# Patient Record
Sex: FEMALE | Race: WHITE | NOT HISPANIC OR LATINO | URBAN - METROPOLITAN AREA
[De-identification: names, ages, dates, MRNs, and addresses within clinical notes are randomized per-mention and may not be internally consistent; named-entity substitution may affect disease eponyms.]

---

## 2020-10-23 ENCOUNTER — TELEPHONE (OUTPATIENT)
Dept: VASCULAR SURGERY | Facility: CLINIC | Age: 85
End: 2020-10-23

## 2020-10-26 ENCOUNTER — OFFICE VISIT (OUTPATIENT)
Dept: VASCULAR SURGERY | Facility: CLINIC | Age: 85
End: 2020-10-26
Payer: COMMERCIAL

## 2020-10-26 ENCOUNTER — TRANSCRIBE ORDERS (OUTPATIENT)
Dept: SCHEDULING | Age: 85
End: 2020-10-26

## 2020-10-26 VITALS — HEART RATE: 71 BPM | SYSTOLIC BLOOD PRESSURE: 102 MMHG | OXYGEN SATURATION: 94 % | DIASTOLIC BLOOD PRESSURE: 61 MMHG

## 2020-10-26 DIAGNOSIS — I73.9 PAD (PERIPHERAL ARTERY DISEASE) (CMS/HCC): Primary | ICD-10-CM

## 2020-10-26 PROCEDURE — 99203 OFFICE O/P NEW LOW 30 MIN: CPT | Performed by: NURSE PRACTITIONER

## 2020-10-26 RX ORDER — CARVEDILOL 6.25 MG/1
6.25 TABLET ORAL 2 TIMES DAILY WITH MEALS
COMMUNITY

## 2020-10-26 RX ORDER — ASPIRIN 81 MG/1
81 TABLET ORAL DAILY
COMMUNITY

## 2020-10-26 RX ORDER — ATORVASTATIN CALCIUM 40 MG/1
40 TABLET, FILM COATED ORAL DAILY
COMMUNITY

## 2020-10-26 RX ORDER — FUROSEMIDE 40 MG/1
40 TABLET ORAL 3 TIMES WEEKLY
COMMUNITY

## 2020-10-26 RX ORDER — LISINOPRIL 20 MG/1
20 TABLET ORAL DAILY
COMMUNITY

## 2020-10-26 SDOH — HEALTH STABILITY: MENTAL HEALTH: HOW OFTEN DO YOU HAVE A DRINK CONTAINING ALCOHOL?: NEVER

## 2020-10-26 NOTE — PROGRESS NOTES
[facility]  [facility], [place] [address], [address]  [address] [place] [address]  (P)[phone] (F) [phone]       DETAILS OF VISIT   Visit Date: 10/26/2020  Peripheral Vascular Disease (NP-non healing wound)      Lashae Eldridge presents to the office today as a new patient referred to us for nonhealing wound to the right outer aspect of her ankle.  Patient has had this wound for a number of months.  She has tried Santyl and multiple treatments.  Patient had ABIs completed by her podiatrist which were 0.63 on the right lower extremity.  At this time we will further evaluate her lower extremity with an arterial duplex.  She denies claudication pain, and has been a non-smoker.  Patient has Doppler pulses to the right lower extremity.  Patient has no ischemic changes.  Patient is a nondiabetic.    MEDICATIONS     •  aspirin  •  atorvastatin  •  carvediloL  •  furosemide  •  lisinopriL    PAST MEDICAL AND SURGICAL HISTORY     Past Medical History:   Diagnosis Date   • CHF (congestive heart failure) (CMS/[facility])      History reviewed. No pertinent surgical history.  Family History   Problem Relation Age of Onset   • No Known Problems Biological Mother    • No Known Problems Biological Father    • Diabetes Biological Son        ALLERGIES     Patient has no known allergies.    SOCIAL/TOBACCO HISTORY     Social History     Socioeconomic History   • Marital status:      Spouse name: None   • Number of children: None   • Years of education: None   • Highest education level: None   Occupational History   • None   Social Needs   • Financial resource strain: None   • Food insecurity     Worry: None     Inability: None   • Transportation needs     Medical: None     Non-medical: None   Tobacco Use   • Smoking status: Never Smoker   • Smokeless tobacco: Never Used   Substance and Sexual Activity   • Alcohol use: Never     Frequency: Never   • Drug use: Defer   • Sexual activity: Defer    Lifestyle   • Physical activity     Days per week: None     Minutes per session: None   • Stress: None   Relationships   • Social connections     Talks on phone: None     Gets together: None     Attends Rastafarian service: None     Active member of club or organization: None     Attends meetings of clubs or organizations: None     Relationship status: None   • Intimate partner violence     Fear of current or ex partner: None     Emotionally abused: None     Physically abused: None     Forced sexual activity: None   Other Topics Concern   • None   Social History Narrative   • None     Social History     Tobacco Use   Smoking Status Never Smoker   Smokeless Tobacco Never Used       REVIEW OF SYSTEMS     Constitutional: No fever, no chills, and no recent weight change. No headache.  HEENT: No neck pain, no neck stiffness, and no lump or swelling in the neck. no hoarseness, no dysphagia.   Cardiovascular: No chest pain or discomfort, no palpitations.  Respiratory: No wheezing. No shortness of breath, no cough, no dyspnea.  Gastrointestinal: Appetite without significant change. No dysphagia, no active abdominal pain, and no melena. No bright red blood per rectum.  Genitourinary: No hematuria, No genital lesion.  No obvious bladder changes.   Endocrine: No polydipsia and no excessive sweating.  Hematologic: No easy bleeding and no tendency for easy bruising.   Integumentary: No obvious masses, No pruritus. No skin lesions and no rash  Musculoskeletal: No new muscle tenderness.  Neurological: No new motor disturbances, or sensory disturbances.   Psychological: Appropriate.    PHYSICAL EXAM     Vitals:   Visit Vitals  /61   Pulse 71   SpO2 94%       Constitutional: alert, appears stated age and cooperative  HEENT: normocephalic, without obvious abnormality, atraumatic, Neck is supple,  symmetrical, trachea midline  Musculoskeletal: symmetric, no curvature. ROM normal. No CVA tenderness.  clear to auscultation  bilaterally  Cardiovascular: No chest pain or discomfort, no palpitations. No edema, cold feet, or claudication.  Respiratory: Clear to auscultation bilaterally,chest expansion symmetric,  eupnea, no adventitious sounds (rales, crackles, wheezes) no wheezing, no rhonki  Gastrointestinal: soft, non-tender; bowel sounds normal; no masses, no organomegaly  Extremities: extremities warm, no cyanosis, clubbing, or edema  Pulses: Right Pulses: FEM: barely palpable, POP: doppler, DP: doppler, PT: doppler  Integumentary: Skin color, texture, turgor normal. No rashes or lesions  Neurologic: No confusion was observed. Oriented to time,place and person. No disorientation was observed. Normal speech, motor,balance, and gait and stance.     IMAGING     I have reviewed the pertinent patient's imaging results. ABIs 0.63 on the right lower extremity.    ASSESSMENT/PLAN     Problem List Items Addressed This Visit        Circulatory    PAD (peripheral artery disease) (CMS/HCC) - Primary    Current Assessment & Plan     Recommend arterial ultrasound of her right lower extremity for non-healing ulceration, pt with abnormal ABIs         Relevant Orders    Ultrasound arterial leg right            MIGDALIA Cheek  10/26/2020    Thank you very much for allowing us to participate in the care of your patient. Please do not hesitate to call or email if there are any questions. The office number is [phone] and my email is [email or web address].  Sincerely,  Deena Dougherty        This document was generated utilizing voice recognition technology. An attempt at proofreading has been made to minimize errors but topographical errors may be present.

## 2020-10-26 NOTE — ASSESSMENT & PLAN NOTE
Recommend arterial ultrasound of her right lower extremity for non-healing ulceration, pt with abnormal ABIs

## 2020-10-26 NOTE — PATIENT INSTRUCTIONS
Dear Lashae Eldridge    Your physician has requested that you have follow up vascular testing.    To schedule your appointment at the [place] location, please call [phone].    The name(s) of your test(s) is/are:    US ARTERIAL LEG RIGHT    After you schedule your testing, please call our office at [phone] to schedule your follow up.     *If you are having the test outside of the [facility], we ask that you obtain a CD of the study and a copy of your report.   Please bring it to our office at least three (3) days prior to your appointment for the physician to review.    If you have any questions, please call the office at [phone].      Thank you,    [facility]

## 2020-10-27 DIAGNOSIS — I73.9 PAD (PERIPHERAL ARTERY DISEASE) (CMS/HCC): Primary | ICD-10-CM

## 2020-11-02 ENCOUNTER — HOSPITAL ENCOUNTER (OUTPATIENT)
Dept: CARDIOLOGY | Facility: HOSPITAL | Age: 85
Discharge: HOME | End: 2020-11-02
Attending: NURSE PRACTITIONER
Payer: COMMERCIAL

## 2020-11-02 ENCOUNTER — OFFICE VISIT (OUTPATIENT)
Dept: VASCULAR SURGERY | Facility: CLINIC | Age: 85
End: 2020-11-02
Payer: COMMERCIAL

## 2020-11-02 VITALS — OXYGEN SATURATION: 95 % | HEART RATE: 67 BPM | DIASTOLIC BLOOD PRESSURE: 89 MMHG | SYSTOLIC BLOOD PRESSURE: 157 MMHG

## 2020-11-02 DIAGNOSIS — I73.9 PAD (PERIPHERAL ARTERY DISEASE) (CMS/HCC): ICD-10-CM

## 2020-11-02 DIAGNOSIS — I73.9 PAD (PERIPHERAL ARTERY DISEASE) (CMS/HCC): Primary | ICD-10-CM

## 2020-11-02 PROCEDURE — 93922 UPR/L XTREMITY ART 2 LEVELS: CPT

## 2020-11-02 PROCEDURE — 93922 UPR/L XTREMITY ART 2 LEVELS: CPT | Mod: 26 | Performed by: SURGERY

## 2020-11-02 PROCEDURE — 93926 LOWER EXTREMITY STUDY: CPT | Mod: RT

## 2020-11-02 PROCEDURE — 93926 LOWER EXTREMITY STUDY: CPT | Mod: 26,RT | Performed by: SURGERY

## 2020-11-02 PROCEDURE — 99214 OFFICE O/P EST MOD 30 MIN: CPT | Performed by: NURSE PRACTITIONER

## 2020-11-02 NOTE — ASSESSMENT & PLAN NOTE
Patient will to the office in 2 to 3 weeks for wound check to the right outer ankle.  Patient also discussed upcoming possible procedure with her podiatrist.  She will continue using her Santyl.  Pictures were taken at today's visit for comparison in 3 weeks.  Patient with evidence of tibial vessel disease on recent ultrasound.  I would recommend an angiogram if wound does not fully resolve.  Patient may also notify the office if they would like to move forward with this procedure prior to her next visit.  We discussed the procedure in detail and patient was given written materials to review.

## 2020-11-02 NOTE — PROGRESS NOTES
[facility]  [facility] [address], [address]  [address] [place] [address]  (P)[phone] (F) [phone]       DETAILS OF VISIT   Visit Date: 11/2/2020  Follow-up (F/u w/Arterials done today)      Lashae Eldridge presents to the office today following her arterial studies completed this morning.  Arterials were reviewed with patient and her daughter during today's visit.  Patient has a wound to the right outer ankle that has not resolved over the past few months.  Patient has been placing Santyl to the wound and she feels that it has improved over the past 2 weeks.  We discussed having a angiogram for her tibial vessel disease to help expedite wound healing.  The patient is slightly reluctant at this time and would like to give the Santyl a little longer to work.  Patient will also discuss this with her podiatrist who she is following up with next week.    MEDICATIONS     •  aspirin  •  atorvastatin  •  carvediloL  •  furosemide  •  lisinopriL    PAST MEDICAL AND SURGICAL HISTORY     Past Medical History:   Diagnosis Date   • CHF (congestive heart failure) (CMS/[facility])      History reviewed. No pertinent surgical history.  Family History   Problem Relation Age of Onset   • No Known Problems Biological Mother    • No Known Problems Biological Father    • Diabetes Biological Son        ALLERGIES     Patient has no known allergies.    SOCIAL/TOBACCO HISTORY     Social History     Socioeconomic History   • Marital status:      Spouse name: None   • Number of children: None   • Years of education: None   • Highest education level: None   Occupational History   • None   Social Needs   • Financial resource strain: None   • Food insecurity     Worry: None     Inability: None   • Transportation needs     Medical: None     Non-medical: None   Tobacco Use   • Smoking status: Never Smoker   • Smokeless tobacco: Never Used   Substance and Sexual Activity   • Alcohol use: Never      Frequency: Never   • Drug use: Defer   • Sexual activity: Defer   Lifestyle   • Physical activity     Days per week: None     Minutes per session: None   • Stress: None   Relationships   • Social connections     Talks on phone: None     Gets together: None     Attends Tenriism service: None     Active member of club or organization: None     Attends meetings of clubs or organizations: None     Relationship status: None   • Intimate partner violence     Fear of current or ex partner: None     Emotionally abused: None     Physically abused: None     Forced sexual activity: None   Other Topics Concern   • None   Social History Narrative   • None     Social History     Tobacco Use   Smoking Status Never Smoker   Smokeless Tobacco Never Used       REVIEW OF SYSTEMS     Constitutional: No fever, no chills, and no recent weight change. No headache.  HEENT: No neck pain, no neck stiffness, and no lump or swelling in the neck. no hoarseness, no dysphagia.   Cardiovascular: No chest pain or discomfort, no palpitations.  Respiratory: No wheezing. No shortness of breath, no cough, no dyspnea.  Gastrointestinal: Appetite without significant change. No dysphagia, no active abdominal pain, and no melena. No bright red blood per rectum.  Genitourinary: No hematuria, No genital lesion.  No obvious bladder changes.   Endocrine: No polydipsia and no excessive sweating.  Hematologic: No easy bleeding and no tendency for easy bruising.   Integumentary: No obvious masses, No pruritus. No skin lesions and no rash  Musculoskeletal: No new muscle tenderness.  Neurological: No new motor disturbances, or sensory disturbances.   Psychological: Appropriate.    PHYSICAL EXAM     Vitals:   Visit Vitals  BP (!) 157/89   Pulse 67   SpO2 95%       Constitutional: alert, appears stated age and cooperative  HEENT: normocephalic, without obvious abnormality, atraumatic, Neck is supple,  symmetrical, trachea midline  Musculoskeletal: symmetric, no  curvature. ROM normal. No CVA tenderness.  clear to auscultation bilaterally  Cardiovascular: No chest pain or discomfort, no palpitations. No edema, cold feet, or claudication.  Respiratory: Clear to auscultation bilaterally,chest expansion symmetric,  eupnea, no adventitious sounds (rales, crackles, wheezes) no wheezing, no rhonki  Gastrointestinal: soft, non-tender; bowel sounds normal; no masses, no organomegaly  Extremities: extremities warm, no cyanosis, clubbing, or edema  Pulses: Right Pulses: FEM: barely palpable, POP: barely palpable, DP: doppler, PT: doppler  Integumentary: Skin color, texture, turgor normal. No rashes or lesions  Neurologic: No confusion was observed. Oriented to time,place and person. No disorientation was observed. Normal speech, motor,balance, and gait and stance.     IMAGING     I have reviewed the pertinent patient's imaging results.  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    ASSESSMENT/PLAN     Problem List Items Addressed This Visit        Circulatory    PAD (peripheral artery disease) (CMS/[facility]) - Primary    Current Assessment & Plan     Patient will to the office in 2 to 3 weeks for wound check to the right outer ankle.  Patient also discussed upcoming possible procedure with her podiatrist.  She will continue using her Santyl.  Pictures were taken at today's visit for comparison in 3 weeks.  Patient with evidence of tibial vessel disease on recent ultrasound.  I would recommend an angiogram if wound does not fully resolve.  Patient may also notify the office if they would like to move forward with this procedure prior to her next visit.  We discussed the procedure in detail and patient was given written materials to review.                 MIGDALIA Cheek  11/2/2020    Thank you very much for allowing us to participate in the care of your patient. Please do not hesitate to call or email if there are any questions. The office number is [phone] and my  email is [email or web address].  Sincerely,  Deena Dougherty        This document was generated utilizing voice recognition technology. An attempt at proofreading has been made to minimize errors but topographical errors may be present.

## 2020-11-06 LAB
LEFT 1ST TOE INDEX: 0.5
LEFT 1ST TOE: 81 MMHG
LEFT ABI: 0.95
LEFT ARM BP: 163 MMHG
LEFT DORSALIS PEDIS INDEX: 0.91
LEFT DORSALIS PEDIS: 149 MMHG
LEFT POST TIBIAL INDEX: 0.95
LEFT POSTERIOR TIBIAL: 155 MMHG
LEFT TBI: 0.5
RIGHT 1ST TOE INDEX: 0.45
RIGHT 1ST TOE: 74 MMHG
RIGHT ABI: 0.73
RIGHT ARM BP: 160 MMHG
RIGHT DORSALIS PEDIS INDEX: 0.73
RIGHT DORSALIS PEDIS: 119 MMHG
RIGHT POST TIBIAL INDEX: 0.31
RIGHT POSTERIOR TIBIAL: 51 MMHG
RIGHT TBI: 0.45
RT ANT TIBIAL MID PSV: 67.28 CM/S
RT CFA DIST PSV: 49.71 CM/S
RT PERONEAL MID PSV: 37.62 CM/S
RT POPLITEAL DIST PSV: 48.61 CM/S
RT POPLITEAL PROX PSV: 56.3 CM/S
RT POPLITEAL PROX RATIO: 0.81
RT PROFUNDA PROX PSV: 58.5 CM/S
RT PROFUNDA PROX RATIO: 1.18
RT SFA DIST PSV: 69.48 CM/S
RT SFA MID PSV: 68.38 CM/S
RT SFA PROX PSV: 54.1 CM/S
RT SFA PROX RATIO: 1.09

## 2020-11-18 DIAGNOSIS — I73.9 PERIPHERAL VASCULAR DISEASE (CMS/HCC): Primary | ICD-10-CM

## 2020-11-18 DIAGNOSIS — Z01.818 PRE-OP TESTING: ICD-10-CM

## 2020-11-19 ENCOUNTER — APPOINTMENT (OUTPATIENT)
Dept: LAB | Facility: HOSPITAL | Age: 85
End: 2020-11-19
Attending: NURSE PRACTITIONER
Payer: COMMERCIAL

## 2020-11-19 ENCOUNTER — OFFICE VISIT (OUTPATIENT)
Dept: VASCULAR SURGERY | Facility: CLINIC | Age: 85
End: 2020-11-19
Payer: COMMERCIAL

## 2020-11-19 ENCOUNTER — APPOINTMENT (OUTPATIENT)
Dept: LAB | Facility: HOSPITAL | Age: 85
End: 2020-11-19
Attending: SURGERY
Payer: COMMERCIAL

## 2020-11-19 DIAGNOSIS — Z01.818 PRE-OP TESTING: Primary | ICD-10-CM

## 2020-11-19 DIAGNOSIS — I73.9 PERIPHERAL VASCULAR DISEASE (CMS/HCC): ICD-10-CM

## 2020-11-19 DIAGNOSIS — Z01.818 PRE-OP TESTING: ICD-10-CM

## 2020-11-19 DIAGNOSIS — I73.9 PAD (PERIPHERAL ARTERY DISEASE) (CMS/HCC): Primary | ICD-10-CM

## 2020-11-19 LAB
ERYTHROCYTE [DISTWIDTH] IN BLOOD BY AUTOMATED COUNT: 14 % (ref 11.7–14.4)
HCT VFR BLDCO AUTO: 36.7 % (ref 35–45)
HGB BLD-MCNC: 11.2 G/DL (ref 11.8–15.7)
INR PPP: 1 INR
MCH RBC QN AUTO: 30.2 PG (ref 28–33.2)
MCHC RBC AUTO-ENTMCNC: 30.5 G/DL (ref 32.2–35.5)
MCV RBC AUTO: 98.9 FL (ref 83–98)
PDW BLD AUTO: 10.4 FL (ref 9.4–12.3)
PLATELET # BLD AUTO: 244 K/UL (ref 150–369)
PROTHROMBIN TIME: 13.1 SEC (ref 12.2–14.5)
RBC # BLD AUTO: 3.71 M/UL (ref 3.93–5.22)
WBC # BLD AUTO: 4.56 K/UL (ref 3.8–10.5)

## 2020-11-19 PROCEDURE — 99213 OFFICE O/P EST LOW 20 MIN: CPT | Performed by: SURGERY

## 2020-11-19 PROCEDURE — 80048 BASIC METABOLIC PNL TOTAL CA: CPT

## 2020-11-19 PROCEDURE — 36415 COLL VENOUS BLD VENIPUNCTURE: CPT

## 2020-11-19 PROCEDURE — 85610 PROTHROMBIN TIME: CPT

## 2020-11-19 PROCEDURE — 85027 COMPLETE CBC AUTOMATED: CPT

## 2020-11-19 PROCEDURE — U0003 INFECTIOUS AGENT DETECTION BY NUCLEIC ACID (DNA OR RNA); SEVERE ACUTE RESPIRATORY SYNDROME CORONAVIRUS 2 (SARS-COV-2) (CORONAVIRUS DISEASE [COVID-19]), AMPLIFIED PROBE TECHNIQUE, MAKING USE OF HIGH THROUGHPUT TECHNOLOGIES AS DESCRIBED BY CMS-2020-01-R: HCPCS

## 2020-11-19 NOTE — ASSESSMENT & PLAN NOTE
Lashae Eldridge is an 89-year-old who presents for wound recheck.  She has a 1 cm ulcer with fibrinous exudate on the right lateral malleolus without signs of surrounding infection.  This wound is been present for several months.  Right resting STORMY is 0.73 with an ultrasound that suggests a posterior tibial occlusion.  Her foot has an appearance of chronic ischemia despite the STORMY.  Recommend diagnostic angiogram with possible interventions which we will perform next week.

## 2020-11-19 NOTE — PROGRESS NOTES
[facility]  [facility] [address]  [address] [place] [address]  P: [phone]     F: [phone]       FOLLOW UP VISIT   11/19/2020  Lashae Eldridge               YOB: 1930  [id number]    PCP:  Luis Alberto Iverson DO    Diagnosis:  Non healing wound right lateral malleolus     HISTORY OF PRESENT ILLNESS        Lashae Eldridge is a 89 y.o. female returning to the office for continued management of right lower extremity nonhealing wounds.  The wound is been present for months.  She was previously evaluated with an STORMY and duplex demonstrating a chronic posterior tibial occlusion with an STORMY mildly diminished at 0.7.      PAST MEDICAL AND SURGICAL HISTORY        Past Medical History:   Diagnosis Date   • CHF (congestive heart failure) (CMS/[facility])        History reviewed. No pertinent surgical history.    MEDICATIONS        Current Outpatient Medications on File Prior to Visit   Medication Sig Dispense Refill   • aspirin 81 mg enteric coated tablet Take 81 mg by mouth daily.     • atorvastatin (LIPITOR) 40 mg tablet Take 40 mg by mouth daily.     • carvediloL (COREG) 25 mg tablet Take 25 mg by mouth 2 (two) times a day with meals.     • furosemide (LASIX) 40 mg tablet Take 40 mg by mouth daily.     • lisinopriL (PRINIVIL) 20 mg tablet Take 20 mg by mouth daily.       No current facility-administered medications on file prior to visit.        ALLERGIES        Patient has no known allergies.     PHYSICAL EXAMINATION      There were no vitals taken for this visit.  There is no height or weight on file to calculate BMI.      PHYSICAL EXAM:    Head/face: normocephalic, atraumatic, no facial asymmetry.  Neck: supple; trachea midline; no carotid bruits, no cervical lymphadenopathy.  Chest: Heart with regular rate and rhythm, S1/S2, no murmur. Lung clear to auscultation bilaterally without crackles, rales, or rhonchi.  Abdomen: soft, non tender, and  non-distended without guarding or rebound.  No masses or organomegaly.  No hernias.  No abdominal bruits.  Back/spine: no CVA tenderness.  Right lower extremity: Pulse exam posterior tibial monophasic signal.  No palpable dorsal pedal or posterior tibial pulse.  There is a 1 cm ulcer with fibrinous exudate on the right lateral malleolus without evidence of drainage, purulence, or significant surrounding erythema.  The foot appears chronically ischemic.    LABS / IMAGING / EKG        Imaging  Ankle-brachial indices and duplex reviewed by me demonstrate a posterior tibial occlusion on the right leg as well as a resting STORMY of 0.73 on the right.    ASSESSMENT AND PLAN         Problem List Items Addressed This Visit        Circulatory    PAD (peripheral artery disease) (CMS/HCC) - Primary    Current Assessment & Plan     Lashae Eldridge is an 89-year-old who presents for wound recheck.  She has a 1 cm ulcer with fibrinous exudate on the right lateral malleolus without signs of surrounding infection.  This wound is been present for several months.  Right resting STORMY is 0.73 with an ultrasound that suggests a posterior tibial occlusion.  Her foot has an appearance of chronic ischemia despite the STORMY.  Recommend diagnostic angiogram with possible interventions which we will perform next week.                  Return in about 1 week (around 11/26/2020).       Waldo Ramos MD  Vascular and Endovascular Surgery  [facility]      Thank you very much for allowing us to participate in the care of your patient.  I will keep you informed of Lashae Eldridge's care.  Please not hesitate to call or email if there are any questions.  I can be reached at [phone] (mobile) or [email or web address].  Sincerely.    Pierre Ramos    This document was generated utilizing voice recognition technology. An attempt at proofreading has been made to minimize errors but typographical errors may be present.

## 2020-11-19 NOTE — LETTER
November 19, 2020     Luis Alberto Iverson DO  [address] [address]    Patient: Lashae Eldridge  YOB: 1930  Date of Visit: 11/19/2020      Dear Dr. Iverson:    Thank you for referring Lashae Eldridge to me for evaluation. Below are my notes for this consultation.    If you have questions, please do not hesitate to call me. I look forward to following your patient along with you.         Sincerely,        Waldo Ramos MD        CC: No Recipients  Waldo Ramos MD  11/19/2020  3:02 PM  Signed       [facility]  [facility] [address]  [address] [place] [address]  P: [phone]     F: [phone]       FOLLOW UP VISIT   11/19/2020  Lashae Eldridge               YOB: 1930  [id number]    PCP:  Luis Alberto Iverson DO    Diagnosis:  Non healing wound right lateral malleolus     HISTORY OF PRESENT ILLNESS        Lashae Eldridge is a 89 y.o. female returning to the office for continued management of right lower extremity nonhealing wounds.  The wound is been present for months.  She was previously evaluated with an STORMY and duplex demonstrating a chronic posterior tibial occlusion with an STORMY mildly diminished at 0.7.      PAST MEDICAL AND SURGICAL HISTORY        Past Medical History:   Diagnosis Date   • CHF (congestive heart failure) (CMS/[facility])        History reviewed. No pertinent surgical history.    MEDICATIONS        Current Outpatient Medications on File Prior to Visit   Medication Sig Dispense Refill   • aspirin 81 mg enteric coated tablet Take 81 mg by mouth daily.     • atorvastatin (LIPITOR) 40 mg tablet Take 40 mg by mouth daily.     • carvediloL (COREG) 25 mg tablet Take 25 mg by mouth 2 (two) times a day with meals.     • furosemide (LASIX) 40 mg tablet Take 40 mg by mouth daily.     • lisinopriL (PRINIVIL) 20 mg tablet Take 20 mg by mouth daily.       No current facility-administered medications on file  prior to visit.        ALLERGIES        Patient has no known allergies.     PHYSICAL EXAMINATION      There were no vitals taken for this visit.  There is no height or weight on file to calculate BMI.      PHYSICAL EXAM:    Head/face: normocephalic, atraumatic, no facial asymmetry.  Neck: supple; trachea midline; no carotid bruits, no cervical lymphadenopathy.  Chest: Heart with regular rate and rhythm, S1/S2, no murmur. Lung clear to auscultation bilaterally without crackles, rales, or rhonchi.  Abdomen: soft, non tender, and non-distended without guarding or rebound.  No masses or organomegaly.  No hernias.  No abdominal bruits.  Back/spine: no CVA tenderness.  Right lower extremity: Pulse exam posterior tibial monophasic signal.  No palpable dorsal pedal or posterior tibial pulse.  There is a 1 cm ulcer with fibrinous exudate on the right lateral malleolus without evidence of drainage, purulence, or significant surrounding erythema.  The foot appears chronically ischemic.    LABS / IMAGING / EKG        Imaging  Ankle-brachial indices and duplex reviewed by me demonstrate a posterior tibial occlusion on the right leg as well as a resting STORMY of 0.73 on the right.    ASSESSMENT AND PLAN         Problem List Items Addressed This Visit        Circulatory    PAD (peripheral artery disease) (CMS/HCC) - Primary    Current Assessment & Plan     Lashae Eldridge is an 89-year-old who presents for wound recheck.  She has a 1 cm ulcer with fibrinous exudate on the right lateral malleolus without signs of surrounding infection.  This wound is been present for several months.  Right resting STORMY is 0.73 with an ultrasound that suggests a posterior tibial occlusion.  Her foot has an appearance of chronic ischemia despite the STORMY.  Recommend diagnostic angiogram with possible interventions which we will perform next week.                  Return in about 1 week (around 11/26/2020).       Waldo Ramos MD  Vascular and Endovascular  Surgery  [facility]      Thank you very much for allowing us to participate in the care of your patient.  I will keep you informed of Lashae Eldridge's care.  Please not hesitate to call or email if there are any questions.  I can be reached at [phone] (mobile) or [email or web address].  Sincerely.    Pierre Ramos    This document was generated utilizing voice recognition technology. An attempt at proofreading has been made to minimize errors but typographical errors may be present.

## 2020-11-20 LAB
ANION GAP SERPL CALC-SCNC: 12 MEQ/L (ref 3–15)
BUN SERPL-MCNC: 21 MG/DL (ref 8–20)
CALCIUM SERPL-MCNC: 8.9 MG/DL (ref 8.9–10.3)
CHLORIDE SERPL-SCNC: 96 MEQ/L (ref 98–109)
CO2 SERPL-SCNC: 29 MEQ/L (ref 22–32)
CREAT SERPL-MCNC: 1.2 MG/DL (ref 0.6–1.1)
GFR SERPL CREATININE-BSD FRML MDRD: 42.4 ML/MIN/1.73M*2
GLUCOSE SERPL-MCNC: 87 MG/DL (ref 70–99)
POTASSIUM SERPL-SCNC: 4.4 MEQ/L (ref 3.6–5.1)
SODIUM SERPL-SCNC: 137 MEQ/L (ref 136–144)

## 2020-11-22 LAB — SARS-COV-2 RNA RESP QL NAA+PROBE: NOT DETECTED

## 2020-11-24 ENCOUNTER — HOSPITAL ENCOUNTER (OUTPATIENT)
Facility: HOSPITAL | Age: 85
Discharge: HOME | End: 2020-11-24
Attending: SURGERY | Admitting: SURGERY
Payer: COMMERCIAL

## 2020-11-24 VITALS
DIASTOLIC BLOOD PRESSURE: 74 MMHG | HEART RATE: 72 BPM | OXYGEN SATURATION: 100 % | SYSTOLIC BLOOD PRESSURE: 128 MMHG | BODY MASS INDEX: 17.07 KG/M2 | WEIGHT: 100 LBS | RESPIRATION RATE: 20 BRPM | HEIGHT: 64 IN

## 2020-11-24 DIAGNOSIS — I73.9 PERIPHERAL VASCULAR DISEASE (CMS/HCC): ICD-10-CM

## 2020-11-24 DIAGNOSIS — Z01.818 PRE-OP TESTING: ICD-10-CM

## 2020-11-24 PROCEDURE — 25000000 HC PHARMACY GENERAL: Performed by: SURGERY

## 2020-11-24 PROCEDURE — 37224 PERIPHERAL CATHETERIZATION: CPT | Mod: RT | Performed by: SURGERY

## 2020-11-24 PROCEDURE — 047M3ZZ DILATION OF RIGHT POPLITEAL ARTERY, PERCUTANEOUS APPROACH: ICD-10-PCS | Performed by: SURGERY

## 2020-11-24 PROCEDURE — 63600105 HC IODINE BASED CONTRAST: Mod: JW | Performed by: SURGERY

## 2020-11-24 PROCEDURE — C1725 CATH, TRANSLUMIN NON-LASER: HCPCS | Performed by: SURGERY

## 2020-11-24 PROCEDURE — 37228 PERIPHERAL CATHETERIZATION: CPT | Mod: RT | Performed by: SURGERY

## 2020-11-24 PROCEDURE — 71000001 HC PACU PHASE 1 INITIAL 30MIN: Performed by: SURGERY

## 2020-11-24 PROCEDURE — 63600000 HC DRUGS/DETAIL CODE: Performed by: SURGERY

## 2020-11-24 PROCEDURE — 047T3ZZ DILATION OF RIGHT PERONEAL ARTERY, PERCUTANEOUS APPROACH: ICD-10-PCS | Performed by: SURGERY

## 2020-11-24 PROCEDURE — C1769 GUIDE WIRE: HCPCS | Performed by: SURGERY

## 2020-11-24 PROCEDURE — 76937 US GUIDE VASCULAR ACCESS: CPT | Performed by: SURGERY

## 2020-11-24 PROCEDURE — 63700000 HC SELF-ADMINISTRABLE DRUG: Performed by: SURGERY

## 2020-11-24 PROCEDURE — 37224 HC FEM/POPL REVASC W/TLA: CPT | Performed by: SURGERY

## 2020-11-24 PROCEDURE — B41F1ZZ FLUOROSCOPY OF RIGHT LOWER EXTREMITY ARTERIES USING LOW OSMOLAR CONTRAST: ICD-10-PCS | Performed by: SURGERY

## 2020-11-24 PROCEDURE — 37228 HC TIB/PER REVASC W/TLA: CPT | Mod: RT

## 2020-11-24 PROCEDURE — C1894 INTRO/SHEATH, NON-LASER: HCPCS | Performed by: SURGERY

## 2020-11-24 PROCEDURE — 71000011 HC PACU PHASE 1 EA ADDL MIN: Performed by: SURGERY

## 2020-11-24 PROCEDURE — 75710 ARTERY X-RAYS ARM/LEG: CPT | Mod: RT | Performed by: SURGERY

## 2020-11-24 PROCEDURE — 27200000 HC STERILE SUPPLY: Performed by: SURGERY

## 2020-11-24 PROCEDURE — C1887 CATHETER, GUIDING: HCPCS | Performed by: SURGERY

## 2020-11-24 PROCEDURE — C1760 CLOSURE DEV, VASC: HCPCS | Performed by: SURGERY

## 2020-11-24 RX ORDER — MIDAZOLAM HYDROCHLORIDE 2 MG/2ML
INJECTION, SOLUTION INTRAMUSCULAR; INTRAVENOUS AS NEEDED
Status: DISCONTINUED | OUTPATIENT
Start: 2020-11-24 | End: 2020-11-24 | Stop reason: HOSPADM

## 2020-11-24 RX ORDER — PROTAMINE SULFATE 10 MG/ML
INJECTION, SOLUTION INTRAVENOUS AS NEEDED
Status: DISCONTINUED | OUTPATIENT
Start: 2020-11-24 | End: 2020-11-24 | Stop reason: HOSPADM

## 2020-11-24 RX ORDER — HEPARIN SODIUM 1000 [USP'U]/ML
INJECTION, SOLUTION INTRAVENOUS; SUBCUTANEOUS AS NEEDED
Status: DISCONTINUED | OUTPATIENT
Start: 2020-11-24 | End: 2020-11-24 | Stop reason: HOSPADM

## 2020-11-24 RX ORDER — FENTANYL CITRATE 50 UG/ML
INJECTION, SOLUTION INTRAMUSCULAR; INTRAVENOUS AS NEEDED
Status: DISCONTINUED | OUTPATIENT
Start: 2020-11-24 | End: 2020-11-24 | Stop reason: HOSPADM

## 2020-11-24 RX ORDER — LIDOCAINE HYDROCHLORIDE 10 MG/ML
INJECTION, SOLUTION INFILTRATION; PERINEURAL AS NEEDED
Status: DISCONTINUED | OUTPATIENT
Start: 2020-11-24 | End: 2020-11-24 | Stop reason: HOSPADM

## 2020-11-24 RX ORDER — IODIXANOL 320 MG/ML
INJECTION, SOLUTION INTRAVASCULAR AS NEEDED
Status: DISCONTINUED | OUTPATIENT
Start: 2020-11-24 | End: 2020-11-24 | Stop reason: HOSPADM

## 2020-11-24 RX ORDER — CLOPIDOGREL BISULFATE 75 MG/1
75 TABLET ORAL DAILY
Qty: 30 TABLET | Refills: 0 | Status: SHIPPED | OUTPATIENT
Start: 2020-11-24 | End: 2020-12-21 | Stop reason: SDUPTHER

## 2020-11-24 RX ORDER — CLOPIDOGREL BISULFATE 75 MG/1
TABLET ORAL AS NEEDED
Status: DISCONTINUED | OUTPATIENT
Start: 2020-11-24 | End: 2020-11-24 | Stop reason: HOSPADM

## 2020-11-24 NOTE — Clinical Note
The bilateral groins was site clipped, marked  and prepped with ChloraPrep. The patient was draped in a sterile fashion after allowing for the recommended dry time.

## 2020-11-24 NOTE — PRE-SEDATION DOCUMENTATION
Sedation Plan    ASA 3     Mallampati class: I.    Risks, benefits, and alternatives discussed with patient.

## 2020-11-24 NOTE — Clinical Note
Patient placed on procedure table in supine position with arms at side. Positioning devices: arm board under arms and safety strap applied.

## 2020-11-24 NOTE — DISCHARGE INSTRUCTIONS
You underwent angiogram of the right lower extremity with balloon angioplasty of the peroneal artery (below the knee).    The following are instructions from the physician for care after an angiogram.    The Day of the Angiogram    Activity:  Light activity   No heavy lifting or strenuous activity for 2 days  Diet and Medications:  Increase your fluid intake.   If you have kidney disease or congestive heart failure, check with your primary doctor first.   Resume your usual diet and medications unless instructed otherwise.  Bathing:  You may shower.   No tub or jacuzzi baths for 7 days.  Dressing:  Leave dressing in place.      The Day After the Angiogram     Activity:   Gradually increase activity.   You may return to work if you do non-strenuous activities.   Avoid heavy lifting or exercise.  Care of Puncture Site:  You may shower.   After showering, gently pat area dry around puncture site.   Place a Band-Aid over the puncture site.   Keep clean and dry.   Apply a clean Band-Aid until the area is healed.    Second Day after the Angiogram    Activity:  Resume usual activities.   Return to work unless otherwise instructed.   Avoid strenuous exercise of excessive bending at the groin.  Warning Signs  If any of the following symptoms occur, call your physician [phone].  If you experience a life-threatening emergency, call 911.  Pain, numbness, coolness or weakness in your leg.   Redness, swelling or bleeding at the puncture site.   Bloody drainage that leaks around or soaks through the Band-Aid dressing.   Green or yellow drainage from the puncture site.   Temperature higher than 100 degrees Fahrenheit.  Future Appointments and Treatment:  Please contact the office at [phone] for a follow-up appointment to discuss the results of your angiogram.

## 2020-11-24 NOTE — Clinical Note
Patient placed on procedure table in with arms at side. Positioning devices: arm board under arms and safety strap applied.

## 2020-11-24 NOTE — OR SURGEON
Pre-Procedure patient identification:  I am the primary operating surgeon/proceduralist and I have identified the patient and confirmed laterality is right on 11/24/20 at 12:44 PM Waldo Ramos MD  Phone Number: [phone]

## 2020-11-24 NOTE — POST-PROCEDURE NOTE
Pt s/p left fem approach agram.  Ecchymotic area noted in groin.  Unchanged from post procedure.  Pt denies pain vss and pulses as documented.

## 2020-11-24 NOTE — H&P
Vascular Surgery H&P  Admitting Diagnosis: Peripheral vascular disease (CMS/[facility]) [I73.9]  Pre-op testing [Z01.818]  HPI     Patient is a 89 y.o. female who presents with non healing wound over right lateral malleolus present greater than 1 month.     Medical History:   Past Medical History:   Diagnosis Date   • CHF (congestive heart failure) (CMS/[facility])    • Hypertension    • Lipid disorder        Surgical History: History reviewed. No pertinent surgical history.    Social History:   Social History     Socioeconomic History   • Marital status:      Spouse name: None   • Number of children: None   • Years of education: None   • Highest education level: None   Occupational History   • None   Social Needs   • Financial resource strain: None   • Food insecurity     Worry: None     Inability: None   • Transportation needs     Medical: None     Non-medical: None   Tobacco Use   • Smoking status: Never Smoker   • Smokeless tobacco: Never Used   Substance and Sexual Activity   • Alcohol use: Never     Frequency: Never   • Drug use: Defer   • Sexual activity: Defer   Lifestyle   • Physical activity     Days per week: None     Minutes per session: None   • Stress: None   Relationships   • Social connections     Talks on phone: None     Gets together: None     Attends Sabianist service: None     Active member of club or organization: None     Attends meetings of clubs or organizations: None     Relationship status: None   • Intimate partner violence     Fear of current or ex partner: None     Emotionally abused: None     Physically abused: None     Forced sexual activity: None   Other Topics Concern   • None   Social History Narrative   • None       Family History:   Family History   Problem Relation Age of Onset   • No Known Problems Biological Mother    • No Known Problems Biological Father    • Diabetes Biological Son        Allergies: Patient has no known allergies.       Medication List      ASK your doctor about  these medications    aspirin 81 mg enteric coated tablet  Take 81 mg by mouth daily.  Dose: 81 mg     atorvastatin 40 mg tablet  Commonly known as: LIPITOR  Take 40 mg by mouth daily.  Dose: 40 mg     carvediloL 25 mg tablet  Commonly known as: COREG  Take 25 mg by mouth 2 (two) times a day with meals.  Dose: 25 mg     furosemide 40 mg tablet  Commonly known as: LASIX  Take 40 mg by mouth daily.  Dose: 40 mg     lisinopriL 20 mg tablet  Commonly known as: PRINIVIL  Take 20 mg by mouth daily.  Dose: 20 mg          Review of Systems  Systemic: Not tiring easily. No fever, no chills, and no recent weight change. No headache   Neck: No neck pain, no neck stiffness, and no lump or swelling in the neck.  Otolaryngeal: No hearing loss, no earache, no epistaxis, no hoarseness, no sore throat, and no bleeding gums. No mouth sores.   Cardiovascular: No chest pain or discomfort, no palpitations, and the heart rate was not fast.   Pulmonary: No wheezing  Gastrointestinal: Appetite not decreased and appetite not increased. No dysphagia and no heartburn. No nausea, no vomiting, no abdominal pain, and no melena. No bright red blood per rectum, no diarrhea, and no constipation. No bowel/bladder changes.   Genitourinary: No hematuria and no increase in urinary frequency. No dysuria. No genital lesion.   Endocrine: No polydipsia and no excessive sweating.  Hematologic: No easy bleeding and no tendency for easy bruising.   Musculoskeletal: No muscle aches, no localized joint pain, and no localized joint stiffness.   Neurological: No dizziness, no vertigo, no fainting, no motor disturbances, and no sensory disturbances.   Psychological: No anxiety, no depression.  Skin: No pruritus. No skin lesions and no rash.  Gastrointestinal Disorder: No gastric ulcer.       Objective     Vital Signs for the last 24 hours:       Physical Exam  Vitals: There were no vitals taken for this visit.  General Appearance: Well developed, well nourished, in  no acute distress.   Skin: Dry and warm, no jaundice and mucous membranes were normal.  Head: Appearance: Posture of the head was normal.  Neck: Appearance: Of the neck was normal. Trachea: Showed no abnormalities. No masses. No carotid bruits.   Lungs: Clear to auscultation:  Cardiovascular: Jugular Venous Distention: JVD no increased. Heart Rate and Rhythm is normal. Heart Sounds were normal. No murmurs were heard and no thrill.   Back: No costovertebral angle tenderness.   Abdomen: Visual inspection of the abdomen was not distended. Auscultation: Bowel sounds were normal. Palpation: Abdomen was soft, no abdominal rigidity, no tenderness, no rebound tenderness, no abdominal guarding, and no mass was palpated. Liver: Not enlarged. Spleen: Not enlarged.   Extremities: Both lower legs showed no localized swelling. No swelling of the ankles, and mobility was not limited. Feet are warm,pink,normal capillary refill. Normal hair and nail growth. No varicose veins.   Pulses: pedal pulses absent, PT signals present  Neurological: No confusion was observed. Oriented to time,place and person. No disorientation was observed. Normal speech, motor,balance, and gait and stance.   Psychiatric: Mood was euthymic and affect was Normal.    Labs  I reviewed the labs.  Creat 1.2    Imaging  Not applicable    ECG   Not applicable.    VTE Assessment: Yamilethi          Assessment/Plan     Code Status: No Order    89F with PAD and non healing wound right foot/ankle.  For angiogram with possible intervention.  I have discussed the procedure and risks including specific risk of access site complication, contrast reaction, kidney injury, and injury to blood vessels or surrounding structures.

## 2020-11-30 LAB
POCT ACT-LR: 285 SEC (ref 113–149)
POCT TEST: ABNORMAL

## 2020-12-10 ENCOUNTER — OFFICE VISIT (OUTPATIENT)
Dept: VASCULAR SURGERY | Facility: CLINIC | Age: 85
End: 2020-12-10
Payer: COMMERCIAL

## 2020-12-10 VITALS — DIASTOLIC BLOOD PRESSURE: 79 MMHG | SYSTOLIC BLOOD PRESSURE: 137 MMHG

## 2020-12-10 DIAGNOSIS — I73.9 PAD (PERIPHERAL ARTERY DISEASE) (CMS/HCC): Primary | ICD-10-CM

## 2020-12-10 PROCEDURE — 99024 POSTOP FOLLOW-UP VISIT: CPT | Performed by: SURGERY

## 2020-12-10 NOTE — LETTER
December 10, 2020     Luis Alberto Iverson, DO  [address]  [place] [address]    Patient: Lashae Eldridge  YOB: 1930  Date of Visit: 12/10/2020      Dear Dr. Iverson:    Thank you for referring Lashae Eldridge to me for evaluation. Below are my notes for this consultation.    If you have questions, please do not hesitate to call me. I look forward to following your patient along with you.         Sincerely,        Waldo Ramos MD        CC: EMMY Bonilla Henry, MD  12/10/2020 12:02 PM  Signed     [facility]  [facility] [address]  [address] [facility] [place] [address]  P: [phone]     F: [phone]       Vascular Post Op Visit    DETAILS OF VISIT     Visit Date: 12/10/2020    Lashae Eldridge is a 89 y.o. female who had right lower extremity angiogram with peroneal artery balloon angioplasty on November 24 and returns today for post-operative evaluation. She has developed no access site complications.  The wound is still present but has not gotten any larger.  She continues to use Santyl.    The angiogram found patent iliofemoral and popliteal arteries.  There was a severe stenosis of the trunk and peroneal artery origin which was crossed and treated with a balloon angioplasty with no residual stenosis.  The anterior tibial and posterior tibial arteries were completely occluded without evidence of reconstitution.      Objective     Vital Signs for this visit:  Visit Vitals  /79       PHYSICAL EXAM       On the right lateral malleolus there is a 1 cm full-thickness ulcer with fibrinous exudate across the base.  There is some surrounding erythema.  There is no drainage and the area is nontender.  There is a posterior tibial artery biphasic Doppler signal.    IMAGING/LABS       No new labs.      No new imaging results.    ASSESSMENT/PLAN     Problem List Items Addressed This Visit        Circulatory    PAD (peripheral  artery disease) (CMS/HCC) - Primary    Current Assessment & Plan     Lashae Eldridge returns for follow-up after her angiogram performed November 24.  She is doing well.  Access site is without apparent complication.  She has improved Doppler signal at the posterior tibial artery.  Wound is still present but has not gotten any larger.  The angiogram found completely occluded anterior and posterior tibial arteries without distal reconstitution.  Angioplasty was performed of the TP trunk and peroneal artery origin.  The peroneal artery is patent to the ankle.  There are no additional options for further vascular optimization at this time.  I will see her back in 4 months with a repeat arterial duplex.               Waldo Ramos MD  Vascular and Endovascular Surgery  [facility]      Thank you very much for allowing us to participate in the care of your patient.  I will keep you informed of Lashae Eldridge's care.  Please not hesitate to call or email if there are any questions.  I can be reached at [phone] (mobile) or [email or web address].      Sincerely.    Pierre Ramos

## 2020-12-10 NOTE — PROGRESS NOTES
[facility]  [facility] [address] [place] [address]  P: [phone]     F: [phone]       Vascular Post Op Visit    DETAILS OF VISIT     Visit Date: 12/10/2020    Lashae Eldridge is a 89 y.o. female who had right lower extremity angiogram with peroneal artery balloon angioplasty on November 24 and returns today for post-operative evaluation. She has developed no access site complications.  The wound is still present but has not gotten any larger.  She continues to use Santyl.    The angiogram found patent iliofemoral and popliteal arteries.  There was a severe stenosis of the trunk and peroneal artery origin which was crossed and treated with a balloon angioplasty with no residual stenosis.  The anterior tibial and posterior tibial arteries were completely occluded without evidence of reconstitution.      Objective     Vital Signs for this visit:  Visit Vitals  /79       PHYSICAL EXAM       On the right lateral malleolus there is a 1 cm full-thickness ulcer with fibrinous exudate across the base.  There is some surrounding erythema.  There is no drainage and the area is nontender.  There is a posterior tibial artery biphasic Doppler signal.    IMAGING/LABS       No new labs.      No new imaging results.    ASSESSMENT/PLAN     Problem List Items Addressed This Visit        Circulatory    PAD (peripheral artery disease) (CMS/[facility]) - Primary    Current Assessment & Plan     Lashae Eldridge returns for follow-up after her angiogram performed November 24.  She is doing well.  Access site is without apparent complication.  She has improved Doppler signal at the posterior tibial artery.  Wound is still present but has not gotten any larger.  The angiogram found completely occluded anterior and posterior tibial arteries without distal reconstitution.  Angioplasty was performed of the TP trunk and peroneal artery origin.  The peroneal artery is  patent to the ankle.  There are no additional options for further vascular optimization at this time.  I will see her back in 4 months with a repeat arterial duplex.               Waldo Ramos MD  Vascular and Endovascular Surgery  [facility]      Thank you very much for allowing us to participate in the care of your patient.  I will keep you informed of Lashae Eldridge's care.  Please not hesitate to call or email if there are any questions.  I can be reached at [phone] (mobile) or [email or web address].      Sincerely.    Pierre Ramos

## 2020-12-10 NOTE — ASSESSMENT & PLAN NOTE
Lashae Eldridge returns for follow-up after her angiogram performed November 24.  She is doing well.  Access site is without apparent complication.  She has improved Doppler signal at the posterior tibial artery.  Wound is still present but has not gotten any larger.  The angiogram found completely occluded anterior and posterior tibial arteries without distal reconstitution.  Angioplasty was performed of the TP trunk and peroneal artery origin.  The peroneal artery is patent to the ankle.  There are no additional options for further vascular optimization at this time.  I will see her back in 4 months with a repeat arterial duplex.

## 2020-12-21 ENCOUNTER — OFFICE VISIT (OUTPATIENT)
Dept: INTERNAL MEDICINE | Facility: CLINIC | Age: 85
End: 2020-12-21
Payer: COMMERCIAL

## 2020-12-21 VITALS
WEIGHT: 100.2 LBS | OXYGEN SATURATION: 99 % | BODY MASS INDEX: 17.11 KG/M2 | HEART RATE: 69 BPM | HEIGHT: 64 IN | SYSTOLIC BLOOD PRESSURE: 136 MMHG | RESPIRATION RATE: 16 BRPM | TEMPERATURE: 97.7 F | DIASTOLIC BLOOD PRESSURE: 76 MMHG

## 2020-12-21 DIAGNOSIS — E78.2 MIXED HYPERLIPIDEMIA: ICD-10-CM

## 2020-12-21 DIAGNOSIS — N18.30 CHRONIC RENAL INSUFFICIENCY, STAGE 3 (MODERATE) (CMS/HCC): ICD-10-CM

## 2020-12-21 DIAGNOSIS — I10 ESSENTIAL HYPERTENSION: ICD-10-CM

## 2020-12-21 DIAGNOSIS — I73.9 PAD (PERIPHERAL ARTERY DISEASE) (CMS/HCC): ICD-10-CM

## 2020-12-21 DIAGNOSIS — I50.20 SYSTOLIC CONGESTIVE HEART FAILURE, UNSPECIFIED HF CHRONICITY (CMS/HCC): Primary | ICD-10-CM

## 2020-12-21 PROCEDURE — 99387 INIT PM E/M NEW PAT 65+ YRS: CPT | Performed by: INTERNAL MEDICINE

## 2020-12-21 RX ORDER — CALCIUM CARBONATE/VITAMIN D3 600MG-5MCG
1 TABLET ORAL DAILY
COMMUNITY

## 2020-12-21 RX ORDER — CLOPIDOGREL BISULFATE 75 MG/1
75 TABLET ORAL DAILY
Qty: 90 TABLET | Refills: 1 | Status: SHIPPED | OUTPATIENT
Start: 2020-12-21 | End: 2021-06-17

## 2020-12-21 RX ORDER — CLOTRIMAZOLE AND BETAMETHASONE DIPROPIONATE 10; .64 MG/G; MG/G
CREAM TOPICAL
COMMUNITY
Start: 2020-10-15 | End: 2020-12-21 | Stop reason: ALTCHOICE

## 2020-12-21 RX ORDER — COLLAGENASE SANTYL 250 [ARB'U]/G
OINTMENT TOPICAL
COMMUNITY
Start: 2020-10-15

## 2020-12-21 RX ORDER — AMOXICILLIN AND CLAVULANATE POTASSIUM 875; 125 MG/1; MG/1
TABLET, FILM COATED ORAL
COMMUNITY
Start: 2020-10-15 | End: 2020-12-21 | Stop reason: ALTCHOICE

## 2020-12-21 ASSESSMENT — ENCOUNTER SYMPTOMS
DIZZINESS: 0
TREMORS: 0
PALPITATIONS: 0
SINUS PAIN: 0
NUMBNESS: 0
CONSTIPATION: 0
BLOOD IN STOOL: 0
VOICE CHANGE: 0
PHOTOPHOBIA: 0
ARTHRALGIAS: 0
EYE DISCHARGE: 0
FEVER: 0
BRUISES/BLEEDS EASILY: 0
WHEEZING: 0
APNEA: 0
CONFUSION: 0
SINUS PRESSURE: 0
SHORTNESS OF BREATH: 0
UNEXPECTED WEIGHT CHANGE: 0
DIARRHEA: 0
ACTIVITY CHANGE: 0
FATIGUE: 0
ABDOMINAL DISTENTION: 0
HEMATURIA: 0
WEAKNESS: 0
WOUND: 0
LIGHT-HEADEDNESS: 0
SEIZURES: 0
DIFFICULTY URINATING: 0
ABDOMINAL PAIN: 0
TROUBLE SWALLOWING: 0
CHEST TIGHTNESS: 0
BACK PAIN: 0

## 2020-12-21 ASSESSMENT — PATIENT HEALTH QUESTIONNAIRE - PHQ9: SUM OF ALL RESPONSES TO PHQ9 QUESTIONS 1 & 2: 0

## 2020-12-21 NOTE — PROGRESS NOTES
Patient ID: Lashae Eldridge is a 89 y.o. female.      Problem List Items Addressed This Visit     Chronic renal insufficiency, stage 3 (moderate)     Avoid nephrotoxins  Lab Results   Component Value Date    CREATININE 1.2 (H) 11/19/2020    EGFR 42.4 (L) 11/19/2020    K 4.4 11/19/2020              Essential hypertension     Continue with lisinopril, furosemide  Blood pressure today is controlled, continue with the same medications.  No symptoms of chest pain, pressure, palpitations or shortness of breath.  The patient will continue with low-salt, with healthy diet and exercise.           Mixed hyperlipidemia     Continue with atorvastatin, the patient's hyperlipidemia is currently controlled and no changes were made in terms of the plan.  Would continue with healthy diet and exercise and medications as prescribed.  The patient denies any muscle pain, and understands the importance of compliance.         PAD (peripheral artery disease) (CMS/[facility])     Follows with Dr. Waldo Ramos  Status post intervention  Lateral malleolus is improved           Other Visit Diagnoses     Systolic congestive heart failure, unspecified HF chronicity (CMS/[facility])    -  Primary    Relevant Medications    clopidogreL (PLAVIX) 75 mg tablet    Other Relevant Orders    Lipid panel (Completed)    Hepatic function panel (Completed)    Hemoglobin A1c (Completed)          Patient Active Problem List   Diagnosis   • PAD (peripheral artery disease) (CMS/[facility])   • Peripheral vascular disease (CMS/[facility])   • Pre-op testing   • Essential hypertension   • Mixed hyperlipidemia   • Chronic renal insufficiency, stage 3 (moderate)       Patient's Medications   New Prescriptions    No medications on file   Previous Medications    ASPIRIN 81 MG ENTERIC COATED TABLET    Take 81 mg by mouth daily.    ATORVASTATIN (LIPITOR) 40 MG TABLET    Take 40 mg by mouth daily.    CALCIUM CARBONATE-VITAMIN D3 600 MG(1,500MG) -200 UNIT PER TABLET    Take 1 tablet by mouth daily.     CARVEDILOL (COREG) 25 MG TABLET    Take 25 mg by mouth daily.      FUROSEMIDE (LASIX) 40 MG TABLET    Take 40 mg by mouth daily.    LISINOPRIL (PRINIVIL) 20 MG TABLET    Take 20 mg by mouth daily.    SANTYL OINTMENT       Modified Medications    Modified Medication Previous Medication    CLOPIDOGREL (PLAVIX) 75 MG TABLET clopidogreL (PLAVIX) 75 mg tablet       Take 1 tablet (75 mg total) by mouth daily.    Take 1 tablet (75 mg total) by mouth daily.   Discontinued Medications    AMOXICILLIN-POT CLAVULANATE (AUGMENTIN) 875-125 MG PER TABLET        CLOTRIMAZOLE-BETAMETHASONE (LOTRISONE) 1-0.05 % CREAM         New Prescriptions    No medications on file       No Known Allergies    Social History     Tobacco Use   • Smoking status: Never Smoker   • Smokeless tobacco: Never Used   Substance Use Topics   • Alcohol use: Never     Frequency: Never       Family History   Problem Relation Age of Onset   • No Known Problems Biological Mother    • No Known Problems Biological Father    • Diabetes Biological Son    • Heart attack Biological Brother    • Asthma Biological Brother        Subjective   The patient presents the office today for follow-up regarding her medical issues.    Pleasant 89-year-old    She has a history of peripheral arterial disease, maximizing medical treatment.  She will continue with Plavix, aspirin.  The patient will continue on treatment for hypertension including medications, limiting salt, healthy diet and exercise.  The patient denied any shortness of breath, chest pain or palpitations.  Hyperlipidemia reviewed today and patient is compliant with healthy diet and exercise.  Discussed goals of treatment for hyperlipidemia to prevent complications.  The patient has no muscle pain, abdominal pain or other intolerances to medication.    She was having trouble with nonhealing wound over her right lateral ankle.  She was seen by Dr. Waldo Ramos and had an intervention.    Recent labs reviewed her GFR was  "42 consistent with 3B renal failure.  Her hemoglobin is 11.2 normochromic normocytic.  She had negative Covid testing.    Lab Results   Component Value Date    WBC 4.56 11/19/2020    HGB 11.2 (L) 11/19/2020    HCT 36.7 11/19/2020     11/19/2020    CHOL 149 12/21/2020    TRIG 59 12/21/2020    HDL 58 12/21/2020    ALT 9 12/21/2020    AST 17 12/21/2020     11/19/2020    K 4.4 11/19/2020    CL 96 (L) 11/19/2020    CREATININE 1.2 (H) 11/19/2020    BUN 21 (H) 11/19/2020    CO2 29 11/19/2020    INR 1.0 11/19/2020    HGBA1C 5.0 12/21/2020         HPI  I reviewed her  medications, recent labs, and correspondence.     Review of Systems   Constitutional: Negative for activity change, fatigue, fever and unexpected weight change.   HENT: Negative for congestion, ear pain, sinus pressure, sinus pain, tinnitus, trouble swallowing and voice change.    Eyes: Negative for photophobia and discharge.   Respiratory: Negative for apnea, chest tightness, shortness of breath and wheezing.    Cardiovascular: Negative for chest pain, palpitations and leg swelling.   Gastrointestinal: Negative for abdominal distention, abdominal pain, blood in stool, constipation and diarrhea.   Genitourinary: Negative for difficulty urinating and hematuria.   Musculoskeletal: Negative for arthralgias and back pain.   Skin: Negative for rash and wound.   Allergic/Immunologic: Negative for food allergies.   Neurological: Negative for dizziness, tremors, seizures, weakness, light-headedness and numbness.   Hematological: Does not bruise/bleed easily.   Psychiatric/Behavioral: Negative for behavioral problems and confusion.       Visit Vitals  /76 (BP Location: Left upper arm, Patient Position: Sitting)   Pulse 69   Temp 36.5 °C (97.7 °F) (Temporal)   Resp 16   Ht 1.626 m (5' 4\")   Wt 45.5 kg (100 lb 3.2 oz)   SpO2 99%   BMI 17.20 kg/m²         Physical Exam  Constitutional:       Appearance: She is well-developed.   HENT:      Head: " Normocephalic and atraumatic.      Right Ear: External ear normal.      Left Ear: External ear normal.      Nose: Nose normal.   Eyes:      Conjunctiva/sclera: Conjunctivae normal.      Pupils: Pupils are equal, round, and reactive to light.   Neck:      Musculoskeletal: Normal range of motion and neck supple.   Cardiovascular:      Rate and Rhythm: Normal rate and regular rhythm.      Heart sounds: Normal heart sounds.   Pulmonary:      Effort: Pulmonary effort is normal.      Breath sounds: Normal breath sounds.   Abdominal:      General: Bowel sounds are normal.      Palpations: Abdomen is soft.   Musculoskeletal: Normal range of motion.   Skin:     General: Skin is warm and dry.   Neurological:      Mental Status: She is alert and oriented to person, place, and time.   Psychiatric:         Behavior: Behavior normal.         Thought Content: Thought content normal.         Judgment: Judgment normal.

## 2020-12-22 LAB
ALBUMIN SERPL-MCNC: 3.8 G/DL (ref 3.6–4.6)
ALP SERPL-CCNC: 66 IU/L (ref 39–117)
ALT SERPL-CCNC: 9 IU/L (ref 0–32)
AST SERPL-CCNC: 17 IU/L (ref 0–40)
BILIRUB DIRECT SERPL-MCNC: 0.11 MG/DL (ref 0–0.4)
BILIRUB SERPL-MCNC: 0.3 MG/DL (ref 0–1.2)
CHOLEST SERPL-MCNC: 149 MG/DL (ref 100–199)
HBA1C MFR BLD: 5 % (ref 4.8–5.6)
HDLC SERPL-MCNC: 58 MG/DL
LDLC SERPL CALC-MCNC: 79 MG/DL (ref 0–99)
PROT SERPL-MCNC: 6.3 G/DL (ref 6–8.5)
TRIGL SERPL-MCNC: 59 MG/DL (ref 0–149)
VLDLC SERPL CALC-MCNC: 12 MG/DL (ref 5–40)

## 2021-01-05 PROBLEM — I10 ESSENTIAL HYPERTENSION: Status: ACTIVE | Noted: 2021-01-05

## 2021-01-05 PROBLEM — E78.2 MIXED HYPERLIPIDEMIA: Status: ACTIVE | Noted: 2021-01-05

## 2021-01-05 PROBLEM — N18.30 CHRONIC RENAL INSUFFICIENCY, STAGE 3 (MODERATE) (CMS/HCC): Status: ACTIVE | Noted: 2021-01-05

## 2021-01-06 NOTE — ASSESSMENT & PLAN NOTE
Avoid nephrotoxins  Lab Results   Component Value Date    CREATININE 1.2 (H) 11/19/2020    EGFR 42.4 (L) 11/19/2020    K 4.4 11/19/2020

## 2021-01-06 NOTE — ASSESSMENT & PLAN NOTE
02/07/18 0821   PRE-TX-O2-ETCO2   O2 Device (Oxygen Therapy) room air   SpO2 96 %   Pulse Oximetry Type Intermittent   $ Pulse Oximetry - Multiple Charge Pulse Oximetry - Multiple   Pulse 61   Resp 18   Incentive Spirometer   $ Incentive Spirometer Charges done with encouragement   Administration (Incentive Spirometer) done with encouragement   Number of Repetitions (Incentive Spirometer) 10   Level (mL) (Incentive Spirometer) 2250   Patient Tolerance good       Patient encouraged to do deep breathing exercises independently.    Continue with atorvastatin, the patient's hyperlipidemia is currently controlled and no changes were made in terms of the plan.  Would continue with healthy diet and exercise and medications as prescribed.  The patient denies any muscle pain, and understands the importance of compliance.

## 2021-01-06 NOTE — ASSESSMENT & PLAN NOTE
Continue with lisinopril, furosemide  Blood pressure today is controlled, continue with the same medications.  No symptoms of chest pain, pressure, palpitations or shortness of breath.  The patient will continue with low-salt, with healthy diet and exercise.

## 2021-01-23 DIAGNOSIS — Z23 ENCOUNTER FOR IMMUNIZATION: ICD-10-CM

## 2021-05-10 ENCOUNTER — TELEPHONE (OUTPATIENT)
Dept: INTERNAL MEDICINE | Facility: CLINIC | Age: 86
End: 2021-05-10

## 2021-05-10 NOTE — TELEPHONE ENCOUNTER
Incoming call from peña([phone]) -[facility] nurse whom will be starting care effective today     Pt b/p standing 84/54  Pt b/p sitting  100/58    Peña has advised us to contact pt son ana @ [phone] for medical reasons .  I explained to peña we don't have ana or beverley siegel on phi form & no current form on file as a fyi and that can cause small concern with discussing care .    Peña will inform ana of that .  I will f/u w/ aelxis on phi policy if pt unable to get in office .

## 2021-05-20 ENCOUNTER — TELEPHONE (OUTPATIENT)
Dept: INTERNAL MEDICINE | Facility: CLINIC | Age: 86
End: 2021-05-20

## 2021-05-28 ENCOUNTER — TELEPHONE (OUTPATIENT)
Dept: INTERNAL MEDICINE | Facility: CLINIC | Age: 86
End: 2021-05-28

## 2021-05-28 DIAGNOSIS — I10 ESSENTIAL HYPERTENSION: Primary | ICD-10-CM

## 2021-06-15 LAB
BASOPHILS # BLD AUTO: 0 X10E3/UL (ref 0–0.2)
BASOPHILS NFR BLD AUTO: 1 %
BUN SERPL-MCNC: 14 MG/DL (ref 8–27)
BUN/CREAT SERPL: 15 (ref 12–28)
CALCIUM SERPL-MCNC: 9.3 MG/DL (ref 8.7–10.3)
CHLORIDE SERPL-SCNC: 101 MMOL/L (ref 96–106)
CO2 SERPL-SCNC: 26 MMOL/L (ref 20–29)
CREAT SERPL-MCNC: 0.94 MG/DL (ref 0.57–1)
EOSINOPHIL # BLD AUTO: 0.3 X10E3/UL (ref 0–0.4)
EOSINOPHIL NFR BLD AUTO: 5 %
ERYTHROCYTE [DISTWIDTH] IN BLOOD BY AUTOMATED COUNT: 12.8 % (ref 11.7–15.4)
GLUCOSE SERPL-MCNC: 91 MG/DL (ref 65–99)
HCT VFR BLD AUTO: 32.9 % (ref 34–46.6)
HGB BLD-MCNC: 10.7 G/DL (ref 11.1–15.9)
IMM GRANULOCYTES # BLD AUTO: 0 X10E3/UL (ref 0–0.1)
IMM GRANULOCYTES NFR BLD AUTO: 0 %
LAB CORP EGFR IF AFRICN AM: 62 ML/MIN/1.73
LAB CORP EGFR IF NONAFRICN AM: 54 ML/MIN/1.73
LYMPHOCYTES # BLD AUTO: 1 X10E3/UL (ref 0.7–3.1)
LYMPHOCYTES NFR BLD AUTO: 16 %
MCH RBC QN AUTO: 29.8 PG (ref 26.6–33)
MCHC RBC AUTO-ENTMCNC: 32.5 G/DL (ref 31.5–35.7)
MCV RBC AUTO: 92 FL (ref 79–97)
MONOCYTES # BLD AUTO: 0.7 X10E3/UL (ref 0.1–0.9)
MONOCYTES NFR BLD AUTO: 12 %
NEUTROPHILS # BLD AUTO: 4 X10E3/UL (ref 1.4–7)
NEUTROPHILS NFR BLD AUTO: 66 %
PLATELET # BLD AUTO: 356 X10E3/UL (ref 150–450)
POTASSIUM SERPL-SCNC: 4.7 MMOL/L (ref 3.5–5.2)
RBC # BLD AUTO: 3.59 X10E6/UL (ref 3.77–5.28)
SODIUM SERPL-SCNC: 140 MMOL/L (ref 134–144)
WBC # BLD AUTO: 6 X10E3/UL (ref 3.4–10.8)

## 2021-06-17 RX ORDER — CLOPIDOGREL BISULFATE 75 MG/1
TABLET ORAL
Qty: 90 TABLET | Refills: 0 | Status: SHIPPED | OUTPATIENT
Start: 2021-06-17 | End: 2021-09-16

## 2021-06-21 ENCOUNTER — OFFICE VISIT (OUTPATIENT)
Dept: INTERNAL MEDICINE | Facility: CLINIC | Age: 86
End: 2021-06-21
Payer: COMMERCIAL

## 2021-06-21 VITALS
BODY MASS INDEX: 15.54 KG/M2 | WEIGHT: 91 LBS | TEMPERATURE: 98 F | HEIGHT: 64 IN | RESPIRATION RATE: 16 BRPM | OXYGEN SATURATION: 98 % | DIASTOLIC BLOOD PRESSURE: 66 MMHG | SYSTOLIC BLOOD PRESSURE: 124 MMHG | HEART RATE: 77 BPM

## 2021-06-21 DIAGNOSIS — I10 ESSENTIAL HYPERTENSION: ICD-10-CM

## 2021-06-21 DIAGNOSIS — E78.2 MIXED HYPERLIPIDEMIA: ICD-10-CM

## 2021-06-21 DIAGNOSIS — N18.30 CHRONIC RENAL INSUFFICIENCY, STAGE 3 (MODERATE) (CMS/HCC): Primary | ICD-10-CM

## 2021-06-21 DIAGNOSIS — I73.9 PAD (PERIPHERAL ARTERY DISEASE) (CMS/HCC): ICD-10-CM

## 2021-06-21 DIAGNOSIS — K11.20 PAROTIDITIS: ICD-10-CM

## 2021-06-21 PROCEDURE — 99214 OFFICE O/P EST MOD 30 MIN: CPT | Performed by: INTERNAL MEDICINE

## 2021-06-21 PROCEDURE — 3008F BODY MASS INDEX DOCD: CPT | Performed by: INTERNAL MEDICINE

## 2021-06-21 RX ORDER — CEFUROXIME AXETIL 250 MG/1
250 TABLET ORAL 2 TIMES DAILY
Qty: 14 TABLET | Refills: 0 | Status: SHIPPED | OUTPATIENT
Start: 2021-06-21 | End: 2021-06-28

## 2021-06-21 ASSESSMENT — ENCOUNTER SYMPTOMS
WHEEZING: 0
APNEA: 0
WOUND: 0
WEAKNESS: 0
SEIZURES: 0
TREMORS: 0
BACK PAIN: 0
VOICE CHANGE: 0
DIARRHEA: 0
DIFFICULTY URINATING: 0
NUMBNESS: 0
FEVER: 0
CONFUSION: 0
DIZZINESS: 0
LIGHT-HEADEDNESS: 0
HEMATURIA: 0
BRUISES/BLEEDS EASILY: 0
ARTHRALGIAS: 0
ACTIVITY CHANGE: 0
CONSTIPATION: 0
EYE DISCHARGE: 0
SINUS PAIN: 0
SINUS PRESSURE: 0
ABDOMINAL PAIN: 0
FATIGUE: 0
BLOOD IN STOOL: 0
ABDOMINAL DISTENTION: 0
PALPITATIONS: 0
UNEXPECTED WEIGHT CHANGE: 0
SHORTNESS OF BREATH: 0
TROUBLE SWALLOWING: 0
PHOTOPHOBIA: 0
CHEST TIGHTNESS: 0

## 2021-06-21 NOTE — PROGRESS NOTES
Patient ID: Lashae Eldridge is a 89 y.o. female.      Problem List Items Addressed This Visit     Chronic renal insufficiency, stage 3 (moderate) (CMS/HCC) - Primary     The patient has chronic renal insufficiency and I will continue to monitor for progression of the disease.  Adjusting drug doses when appropriate for the level of estimated glomerular filtration rate will be accomplished.           Essential hypertension     Blood pressure today is controlled, continue with the same medications.  No symptoms of chest pain, pressure, palpitations or shortness of breath.  The patient will continue with low-salt, with healthy diet and exercise.         Mixed hyperlipidemia     The patient's hyperlipidemia is currently controlled and no changes were made in terms of the plan.  Would continue with healthy diet and exercise and medications as prescribed.  The patient denies any muscle pain, and understands the importance of compliance.         PAD (peripheral artery disease) (CMS/HCC)     Continue to maximize medical treatment         Parotiditis     Ceftin, rest, warm compress see ENT         Relevant Orders    Ambulatory referral to ENT          Patient Active Problem List   Diagnosis   • PAD (peripheral artery disease) (CMS/HCC)   • Peripheral vascular disease (CMS/HCC)   • Pre-op testing   • Essential hypertension   • Mixed hyperlipidemia   • Chronic renal insufficiency, stage 3 (moderate) (CMS/HCC)   • Parotiditis       Patient's Medications   New Prescriptions    No medications on file   Previous Medications    ASPIRIN 81 MG ENTERIC COATED TABLET    Take 81 mg by mouth daily.    ATORVASTATIN (LIPITOR) 40 MG TABLET    Take 40 mg by mouth daily.    CALCIUM CARBONATE-VITAMIN D3 600 MG(1,500MG) -200 UNIT PER TABLET    Take 1 tablet by mouth daily.    CARVEDILOL (COREG) 6.25 MG TABLET    Take 6.25 mg by mouth 2 (two) times a day with meals.     CLOPIDOGREL (PLAVIX) 75 MG TABLET    TAKE ONE TABLET BY MOUTH ONCE DAILY      FUROSEMIDE (LASIX) 40 MG TABLET    Take 40 mg by mouth 3 (three) times a week (Mon, Wed, Fri).      LISINOPRIL (PRINIVIL) 20 MG TABLET    Take 20 mg by mouth daily.    SANTYL OINTMENT       Modified Medications    No medications on file   Discontinued Medications    No medications on file     New Prescriptions    No medications on file       No Known Allergies    Social History     Tobacco Use   • Smoking status: Never Smoker   • Smokeless tobacco: Never Used   Substance Use Topics   • Alcohol use: Never       Family History   Problem Relation Age of Onset   • No Known Problems Biological Mother    • No Known Problems Biological Father    • Diabetes Biological Son    • Heart attack Biological Brother    • Asthma Biological Brother        Subjective   The patient presents the office today for follow-up regarding her medical issues.  Chronic kidney disease is noted and we will continue to monitor medications and creatinine.  Treatment plan includes controlling risk factors that could make renal function worse.  We will try to control blood pressure, and maintain a  normal blood sugar.  The patient will continue on treatment for hypertension including medications, limiting salt, healthy diet and exercise.  The patient denied any shortness of breath, chest pain or palpitations.  HPI  I reviewed her  medications, recent labs, and correspondence.     Review of Systems   Constitutional: Negative for activity change, fatigue, fever and unexpected weight change.   HENT: Negative for congestion, ear pain, sinus pressure, sinus pain, tinnitus, trouble swallowing and voice change.    Eyes: Negative for photophobia and discharge.   Respiratory: Negative for apnea, chest tightness, shortness of breath and wheezing.    Cardiovascular: Negative for chest pain, palpitations and leg swelling.   Gastrointestinal: Negative for abdominal distention, abdominal pain, blood in stool, constipation and diarrhea.   Genitourinary: Negative for  "difficulty urinating and hematuria.   Musculoskeletal: Negative for arthralgias and back pain.   Skin: Negative for rash and wound.   Allergic/Immunologic: Negative for food allergies.   Neurological: Negative for dizziness, tremors, seizures, weakness, light-headedness and numbness.   Hematological: Does not bruise/bleed easily.   Psychiatric/Behavioral: Negative for behavioral problems and confusion.       Visit Vitals  /66 (BP Location: Left upper arm, Patient Position: Sitting)   Pulse 77   Temp 36.7 °C (98 °F) (Oral)   Resp 16   Ht 1.626 m (5' 4\")   Wt 41.3 kg (91 lb)   SpO2 98%   BMI 15.62 kg/m²         Physical Exam  Constitutional:       Appearance: She is well-developed.   HENT:      Head: Normocephalic and atraumatic.      Right Ear: External ear normal.      Left Ear: External ear normal.      Nose: Nose normal.   Eyes:      Conjunctiva/sclera: Conjunctivae normal.      Pupils: Pupils are equal, round, and reactive to light.   Cardiovascular:      Rate and Rhythm: Normal rate and regular rhythm.      Heart sounds: Normal heart sounds.   Pulmonary:      Effort: Pulmonary effort is normal.      Breath sounds: Normal breath sounds.   Abdominal:      General: Bowel sounds are normal.      Palpations: Abdomen is soft.   Musculoskeletal:         General: Normal range of motion.      Cervical back: Normal range of motion and neck supple.   Skin:     General: Skin is warm and dry.   Neurological:      Mental Status: She is alert and oriented to person, place, and time.   Psychiatric:         Behavior: Behavior normal.         Thought Content: Thought content normal.         Judgment: Judgment normal.                          "

## 2021-06-30 NOTE — ASSESSMENT & PLAN NOTE
The patient has chronic renal insufficiency and I will continue to monitor for progression of the disease.  Adjusting drug doses when appropriate for the level of estimated glomerular filtration rate will be accomplished.

## 2021-11-08 ENCOUNTER — OFFICE VISIT (OUTPATIENT)
Dept: INTERNAL MEDICINE | Facility: CLINIC | Age: 86
End: 2021-11-08
Payer: COMMERCIAL

## 2021-11-08 VITALS
OXYGEN SATURATION: 93 % | HEART RATE: 77 BPM | BODY MASS INDEX: 15.47 KG/M2 | RESPIRATION RATE: 16 BRPM | HEIGHT: 64 IN | SYSTOLIC BLOOD PRESSURE: 120 MMHG | TEMPERATURE: 98 F | DIASTOLIC BLOOD PRESSURE: 64 MMHG | WEIGHT: 90.6 LBS

## 2021-11-08 DIAGNOSIS — I73.9 PAD (PERIPHERAL ARTERY DISEASE) (CMS/HCC): ICD-10-CM

## 2021-11-08 DIAGNOSIS — E78.2 MIXED HYPERLIPIDEMIA: ICD-10-CM

## 2021-11-08 DIAGNOSIS — I73.9 PERIPHERAL VASCULAR DISEASE (CMS/HCC): ICD-10-CM

## 2021-11-08 DIAGNOSIS — F02.80 SDAT (SENILE DEMENTIA OF ALZHEIMER'S TYPE) (CMS/HCC): ICD-10-CM

## 2021-11-08 DIAGNOSIS — I50.20 SYSTOLIC CONGESTIVE HEART FAILURE, UNSPECIFIED HF CHRONICITY (CMS/HCC): ICD-10-CM

## 2021-11-08 DIAGNOSIS — G30.1 SDAT (SENILE DEMENTIA OF ALZHEIMER'S TYPE) (CMS/HCC): ICD-10-CM

## 2021-11-08 DIAGNOSIS — Z00.00 MEDICARE ANNUAL WELLNESS VISIT, SUBSEQUENT: Primary | ICD-10-CM

## 2021-11-08 DIAGNOSIS — Z00.00 WELLNESS EXAMINATION: ICD-10-CM

## 2021-11-08 DIAGNOSIS — I70.233 ATHEROSCLEROSIS OF NATIVE ARTERIES OF RIGHT LEG WITH ULCERATION OF ANKLE (CMS/HCC): ICD-10-CM

## 2021-11-08 DIAGNOSIS — N18.30 CHRONIC RENAL INSUFFICIENCY, STAGE 3 (MODERATE) (CMS/HCC): ICD-10-CM

## 2021-11-08 DIAGNOSIS — K11.20 PAROTIDITIS: ICD-10-CM

## 2021-11-08 DIAGNOSIS — I10 ESSENTIAL HYPERTENSION: ICD-10-CM

## 2021-11-08 PROCEDURE — 3008F BODY MASS INDEX DOCD: CPT | Performed by: INTERNAL MEDICINE

## 2021-11-08 PROCEDURE — G0439 PPPS, SUBSEQ VISIT: HCPCS | Performed by: INTERNAL MEDICINE

## 2021-11-08 PROCEDURE — 99214 OFFICE O/P EST MOD 30 MIN: CPT | Mod: 25 | Performed by: INTERNAL MEDICINE

## 2021-11-08 RX ORDER — CEFUROXIME AXETIL 250 MG/1
250 TABLET ORAL 2 TIMES DAILY
Qty: 14 TABLET | Refills: 0 | Status: SHIPPED | OUTPATIENT
Start: 2021-11-08 | End: 2021-11-15

## 2021-11-08 RX ORDER — DONEPEZIL HYDROCHLORIDE 5 MG/1
5 TABLET, FILM COATED ORAL NIGHTLY
Qty: 90 TABLET | Refills: 1 | Status: SHIPPED | OUTPATIENT
Start: 2021-11-08 | End: 2022-05-07

## 2021-11-08 ASSESSMENT — MINI COG
TOTAL SCORE: 2
COMPLETED: YES

## 2021-11-08 ASSESSMENT — PATIENT HEALTH QUESTIONNAIRE - PHQ9: SUM OF ALL RESPONSES TO PHQ9 QUESTIONS 1 & 2: 0

## 2021-11-08 NOTE — PATIENT INSTRUCTIONS
Your Personalized Prevention Plan Services (PPPS)    Preventive Services Checklist (Assumes Average Risk Unless Otherwise Noted):    Health Maintenance Topics with due status: Overdue       Topic Date Due    DEXA Scan Never done    Pneumococcal Never done    Pneumococcal (65 years and older) Never done    Medicare Annual Wellness Visit Never done    Hepatitis C Screening Never done    DTaP, Tdap, and Td Vaccines Never done    Zoster Vaccine Never done    Influenza Vaccine 08/01/2021    COVID-19 Vaccine 08/16/2021     Health Maintenance Topics with due status: Completed       Topic Last Completion Date    Annual Falls Risk Screening 11/08/2021     Health Maintenance Topics with due status: Aged Out       Topic Date Due    Meningococcal ACWY Aged Out    HIB Vaccines Aged Out    IPV Vaccines Aged Out    HPV Vaccines Aged Out       You May Be Eligible for These Additional Preventive Services   (Assumes Average Risk Unless Otherwise Noted)  Diabetes Screening Any 1 risk factor: hypertension, dyslipidemia, obesity, high glucose; or Any 2 risk factors: >=64yo, overweight, family history diabetes (covered every 6 months)   Hepatitis C Screening Any 1 risk factor: 1) blood transfusion before 1992,   2) current or past injection drug use (annually for high risk; if born between 6991[birth year]4727, see above for status).   Vaccine: Hepatitis B As necessary if at-risk: hemophilia, ESRD, diabetes, living with individual infected with hep B, healthcare worker with frequent contact with blood/bodily fluids (series covered once)   Sexually Transmitted Diseases (STDs) As necessary chlamydia, gonorrhea, syphilis, hepatitis B (covered annually)  HIV if any 1 risk factor present: 1) <16yo or >64yo and at increased risk or 2) 15-64yo and ask for it (covered annually)   Lung Cancer Screening Low dose chest CT if all 3 risk factors: 1) 55-78yo, 2) smoker or quit within last 15y, 3) >=30 pack years (covered annually).  No  results found for this or any previous visit.       Cholesterol Screening Both risk factors: 1) >=21yo and 2)  increased risk coronary artery disease (covered every 5 years).     Breast Cancer Screening Covered once 35-40yo, annually >=41yo (if >=51yo, see above for status).         Health Risk Factors with Personalized Education:  ----------------------------------------------------------------------------------------------------------------------  Controlling Your Blood Pressure  · Maintain a normal weight (body mass index between 18.5 and 24.9).  · Eat more fruit, vegetables and low-fat dairy.  · Eat less saturated fat and total fat.  · Lower your sodium (salt) intake.  Try to stay under 1500 mg per day, but if you cannot get your intake to be that low, at least lower it by 1000 mg.  · Stay active.  Try to get at least 90 to 150 minutes of exercise per week.  Try brisk walking, swimming, bicycling or dancing.  · Limit alcohol intake.  When you do consume alcohol, drink no more than 1 drink per day.  · If you have been prescribed medication, take it regularly and exactly as prescribed.  Let your PCP know if you have any problems or questions about your medication.  · Check your blood pressure at home or at the store.  Write down your readings and share them with your PCP  ----------------------------------------------------------------------------------------------------------------------  Controlling Your Cholesterol  · Reduce the amount of saturated and trans fat in your diet.  Limit intake of red meat.  Consume only low-fat or non-fat/skim dairy.  Limit fried food.  Cook with vegetable oils.  · Reduce your intake of sugary foods, sugary drinks and alcohol.  · Eat a diet high in fruit, vegetables and whole grains.  · Get protein from fish, poultry and a small portion of nuts.  · Stay active.  Try to get at least 90 to 150 minutes of exercise per week.  Try brisk walking, swimming, bicycling or  dancing.  · Maintain a healthy weight by balancing your diet and exercise.  · If you have been prescribed medication, take it regularly and exactly as prescribed. Let your PCP know if you have any problems or questions about your medication.  · It’s important to know your cholesterol numbers.  When recommended by your PCP, get the cholesterol blood test.  ----------------------------------------------------------------------------------------------------------------------  Maintaining Strong Bones  · Try to get at least 90 to 150 minutes of weight-bearing exercise per week.  · Ensure intake of at least 1200mg of calcium per day.  Eat foods high in calcium like milk and other dairy, green vegetables, fruit, canned fish with soft and edible bones, nuts, calcium-set tofu.  Some foods are calcium-fortified, like bread, cereal, fruit juices and mineral water.  · Help your body make vitamin D by getting 10-15 minutes per day of sunlight.    · Ensure intake of at least 600IU of vitamin D per day.  Eat foods high in vitamin D like oily fish (salmon, sardines, mackerel) and eggs.  Some foods are fortified with vitamin D, like dairy and cereals.  · Avoid high amounts of caffeine and salt, since they can cause the body to loose calcium.  · Limit alcohol intake, since it is associated with weaker bones and is associated with falls and fractures.  · Limit intake of fizzy drinks.  ----------------------------------------------------------------------------------------------------------------------  Reducing Your Risk of Falls  · Tell your PCP if any of your medications make you feel tired, dizzy, lightheaded or off-balance.  · Maintain coordination, flexibility and balance by ensuring regular physical activity.  · Limit alcohol intake to 1 drink per day.  Consider avoiding all alcohol intake.  · Ensure good vision.  Visit an ophthalmologist or optometrist regularly for vision screening or to make sure your glasses / contact lens  prescription is correct.  If you need glasses or contacts, wear them.  When you get new glasses or contacts, take time to get used to them.  Do not wear sunglasses or tinted lenses when indoors.  · Ensure good hearing.  Have your hearing checked if you are having trouble hearing, or family and friends think you cannot hear them.  If you need a hearing aid, be sure it fits well and wear it.  · Get enough rest.  Ensure about 7-9 hours of sleep every day.  · Get up slowly from your bed or chairs.  Do not start walking until you are sure you feel steady.  · Wear non-skid, rubber-soled, low-heeled shoes.  Do not walk in socks, or in shoes and slippers with smooth soles.  · If your PCP or therapist recommends using a cane or walker, use it regularly.  · Make your home safer.  Increase lighting throughout the house, especially at the top and bottom of stairs.  Ensure lighting is easily turned on when getting up in the middle of the night.  Make sure there are two secure rails on all stairs.  Install grab bars in the bathtub / shower and near the toilet.  Consider using a shower chair and / or a hand-held shower.  · Spread sand or salt on icy surfaces.  Beware of wet surfaces, which can be icy.  · Tell your PCP if you have fallen.

## 2021-11-08 NOTE — PROGRESS NOTES
Patient ID: Lashae Eldridge is a 89 y.o. female.      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  Adjusting drug doses when appropriate for the level of estimated glomerular filtration rate will be accomplished.           Essential hypertension     Blood pressure today is controlled, continue with the same medications.  No symptoms of chest pain, pressure, palpitations or shortness of breath.  The patient will continue with low-salt, with healthy diet and exercise.         Medicare annual wellness visit, subsequent - Primary     MAW completed  Studies reviewed  See problem list         Mixed hyperlipidemia     The patient's hyperlipidemia is currently controlled and no changes were made in terms of the plan.  Would continue with healthy diet and exercise and medications as prescribed.  The patient denies any muscle pain, and understands the importance of compliance.         Relevant Orders    CBC and Differential (Completed)    Comprehensive metabolic panel (Completed)    Lipid panel (Completed)    T4, free (Completed)    TSH 3rd Generation (Completed)    Parotiditis     Resolved, call for recurrence         Relevant Orders    Ambulatory referral to ENT    Peripheral vascular disease (CMS/[facility])     Patient with continue with the same plan of care at this time.         SDAT (senile dementia of Alzheimer's type) (CMS/[facility])     Discussed that dementia is a very common problem and although there is no cure for it there are medications that could help.  We will continue with the same plan of care at this time.  We will continue to monitor him for changes with memory, mood or worsening symptoms.         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   Previous Medications    ASPIRIN 81 MG ENTERIC COATED TABLET    Take 81 mg by mouth daily.    ATORVASTATIN (LIPITOR) 40 MG TABLET    Take 40 mg by mouth daily.    CALCIUM CARBONATE-VITAMIN D3 600 MG(1,500MG) -200 UNIT PER TABLET    Take 1 tablet by mouth daily.    CARVEDILOL (COREG) 6.25 MG TABLET    Take 6.25 mg by mouth 2 (two) times a day with meals.     CLOPIDOGREL (PLAVIX) 75 MG TABLET    TAKE ONE TABLET BY MOUTH ONCE DAILY      FUROSEMIDE (LASIX) 40 MG TABLET    Take 40 mg by mouth 3 (three) times a week (Mon, Wed, Fri).      LISINOPRIL (PRINIVIL) 20 MG TABLET    Take 20 mg by mouth daily.    SANTYL OINTMENT       Modified Medications    No medications on file   Discontinued Medications    No medications on file     New Prescriptions    DONEPEZIL (ARICEPT) 5 MG TABLET    Take 1 tablet (5 mg total) by mouth nightly.       No Known Allergies    Social History     Tobacco Use   • Smoking status: Never Smoker   • Smokeless tobacco: Never Used    Substance Use Topics   • Alcohol use: Never       Family History   Problem Relation Age of Onset   • No Known Problems Biological Mother    • No Known Problems Biological Father    • Diabetes Biological Son    • Heart attack Biological Brother    • Asthma Biological Brother        Subjective   The patient presents the office today for follow-up regarding her medical issues.    89-year-old    Chronic kidney disease is noted and we will continue to monitor medications and creatinine.  Treatment plan includes controlling risk factors that could make renal function worse.  We will try to control blood pressure, and maintain a  normal blood sugar.    The patient will continue on treatment for hypertension including medications, limiting salt, healthy diet and exercise.  The patient denied any shortness of breath, chest pain or palpitations.    Hyperlipidemia reviewed today and patient is compliant with healthy diet and exercise.  Discussed goals of treatment for hyperlipidemia to prevent complications.  The patient has no muscle pain, abdominal pain or other intolerances to medication.    Please get CT scan of the head because of cognitive issues.  We started her on Aricept.    HPI  I reviewed her  medications, recent labs, and correspondence.     Review of Systems   Constitutional: Negative for activity change, fatigue, fever and unexpected weight change.   HENT: Negative for congestion, ear pain, sinus pressure, sinus pain, tinnitus, trouble swallowing and voice change.    Eyes: Negative for photophobia and discharge.   Respiratory: Negative for apnea, chest tightness, shortness of breath and wheezing.    Cardiovascular: Negative for chest pain, palpitations and leg swelling.   Gastrointestinal: Negative for abdominal distention, abdominal pain, blood in stool, constipation and diarrhea.   Genitourinary: Negative for difficulty urinating and hematuria.   Musculoskeletal: Negative for arthralgias and back pain.  "  Skin: Negative for rash and wound.   Allergic/Immunologic: Negative for food allergies.   Neurological: Negative for dizziness, tremors, seizures, weakness, light-headedness and numbness.   Hematological: Does not bruise/bleed easily.   Psychiatric/Behavioral: Negative for behavioral problems and confusion.       Visit Vitals  /64 (BP Location: Right upper arm, Patient Position: Sitting)   Pulse 77   Temp 36.7 °C (98 °F) (Oral)   Resp 16   Ht 1.626 m (5' 4\")   Wt 41.1 kg (90 lb 9.6 oz)   SpO2 93%   BMI 15.55 kg/m²         Physical Exam  Constitutional:       Appearance: She is well-developed.   HENT:      Head: Normocephalic and atraumatic.      Right Ear: External ear normal.      Left Ear: External ear normal.      Nose: Nose normal.   Eyes:      Conjunctiva/sclera: Conjunctivae normal.      Pupils: Pupils are equal, round, and reactive to light.   Cardiovascular:      Rate and Rhythm: Normal rate and regular rhythm.      Heart sounds: Normal heart sounds.   Pulmonary:      Effort: Pulmonary effort is normal.      Breath sounds: Normal breath sounds.   Abdominal:      General: Bowel sounds are normal.      Palpations: Abdomen is soft.   Musculoskeletal:         General: Normal range of motion.      Cervical back: Normal range of motion and neck supple.   Skin:     General: Skin is warm and dry.   Neurological:      Mental Status: She is alert and oriented to person, place, and time.   Psychiatric:         Behavior: Behavior normal.         Thought Content: Thought content normal.         Judgment: Judgment normal.                          "

## 2021-11-08 NOTE — PROGRESS NOTES
Subjective     Lashae Eldridge is a 89 y.o. female who presents for a subsequent annual wellness visit.     Patient Care Team:  Luis Alberto Iverson DO as PCP - General (Internal Medicine)  Dong Crowell DPM as Consulting Physician (Podiatric Surgery)  Miguel Canseco DO as Consulting Physician (Cardiology)    Comprehensive Medical and Social History  Patient Active Problem List   Diagnosis   • PAD (peripheral artery disease) (CMS/[facility])   • Peripheral vascular disease (CMS/[facility])   • Pre-op testing   • Essential hypertension   • Mixed hyperlipidemia   • Chronic renal insufficiency, stage 3 (moderate) (CMS/[facility])   • Parotiditis   • SDAT (senile dementia of Alzheimer's type) (CMS/[facility])   • Wellness examination     Past Medical History:   Diagnosis Date   • CHF (congestive heart failure) (CMS/[facility])    • Hypertension    • Lipid disorder      Past Surgical History:   Procedure Laterality Date   • CATARACT EXTRACTION, BILATERAL       No Known Allergies  Current Outpatient Medications   Medication Sig Dispense Refill   • aspirin 81 mg enteric coated tablet Take 81 mg by mouth daily.     • atorvastatin (LIPITOR) 40 mg tablet Take 40 mg by mouth daily.     • carvediloL (COREG) 6.25 mg tablet Take 6.25 mg by mouth 2 (two) times a day with meals.      • clopidogreL (PLAVIX) 75 mg tablet TAKE ONE TABLET BY MOUTH ONCE DAILY   90 tablet 1   • furosemide (LASIX) 40 mg tablet Take 40 mg by mouth 3 (three) times a week (Mon, Wed, Fri).       • lisinopriL (PRINIVIL) 20 mg tablet Take 20 mg by mouth daily.     • calcium carbonate-vitamin D3 600 mg(1,500mg) -200 unit per tablet Take 1 tablet by mouth daily.     • cefUROXime 250 mg tablet Take 1 tablet (250 mg total) by mouth 2 (two) times a day for 7 days. 14 tablet 0   • donepeziL (ARICEPT) 5 mg tablet Take 1 tablet (5 mg total) by mouth nightly. 90 tablet 1   • SantyL ointment        No current facility-administered medications for this visit.     Social History     Tobacco Use   •  "Smoking status: Never Smoker   • Smokeless tobacco: Never Used   Substance Use Topics   • Alcohol use: Never   • Drug use: Defer     Family History   Problem Relation Age of Onset   • No Known Problems Biological Mother    • No Known Problems Biological Father    • Diabetes Biological Son    • Heart attack Biological Brother    • Asthma Biological Brother        Objective   Vitals  Vitals:    11/08/21 1109   BP: 120/64   BP Location: Right upper arm   Patient Position: Sitting   Pulse: 77   Resp: 16   Temp: 36.7 °C (98 °F)   TempSrc: Oral   SpO2: 93%   Weight: 41.1 kg (90 lb 9.6 oz)   Height: 1.626 m (5' 4\")     Body mass index is 15.55 kg/m².    Advanced Care Plan  Does patient have advance directive?: Yes                                     PHQ  Will the patient answer the depression questions?: Yes   Little interest or pleasure in doing things: Not at all   Feeling down, depressed, or hopeless: Not at all   Depression Risk: 0                                             Mini Cog  Completed: Yes  Score: 2  Result: Positive      Get Up and Go       STEADI Falls Risk  One or more falls in the last year: No           Has trouble stepping up onto a curb: No   Advised to use a cane or walker to get around safely: Yes   Often has to rush to the toilet: No   Feels unsteady when walking: Yes   Has lost some feeling in feet: No   Often feels sad or depressed: No   Steadies self on furniture while walking at home: Yes   Takes medication that makes him/her feel lightheaded or more tired than usual: No   Worried about falling: No   Takes medicine to sleep or improve mood: No   Needs to push with hands when rising from a chair: Yes   Falls screen completed: Yes     Hearing and Vision Screening  No exam data present  See HRA for relevant hearing screening response.    Assessment/Plan   Diagnoses and all orders for this visit:    Chronic renal insufficiency, stage 3 (moderate) (CMS/HCC) (Primary)    Essential " hypertension    Mixed hyperlipidemia  -     CBC and Differential; Future  -     Comprehensive metabolic panel; Future  -     Lipid panel; Future  -     T4, free; Future  -     TSH 3rd Generation; Future    Parotiditis  -     Ambulatory referral to ENT; Future  -     cefUROXime 250 mg tablet; Take 1 tablet (250 mg total) by mouth 2 (two) times a day for 7 days.    PAD (peripheral artery disease) (CMS/[facility])    Peripheral vascular disease (CMS/[facility])    SDAT (senile dementia of Alzheimer's type) (CMS/[facility])  -     donepeziL (ARICEPT) 5 mg tablet; Take 1 tablet (5 mg total) by mouth nightly.  -     Urinalysis with Reflex Culture (ED and Outpatient only); Future  -     CT HEAD WITHOUT IV CONTRAST; Future    Wellness examination        See Patient Instructions (the written plan) which was given to the patient for PPPS and health risk factors with interventions.

## 2021-11-09 LAB
ALBUMIN SERPL-MCNC: 4 G/DL (ref 3.6–4.6)
ALBUMIN/GLOB SERPL: 1.6 {RATIO} (ref 1.2–2.2)
ALP SERPL-CCNC: 88 IU/L (ref 44–121)
ALT SERPL-CCNC: 7 IU/L (ref 0–32)
AST SERPL-CCNC: 16 IU/L (ref 0–40)
BASOPHILS # BLD AUTO: 0.1 X10E3/UL (ref 0–0.2)
BASOPHILS NFR BLD AUTO: 1 %
BILIRUB SERPL-MCNC: 0.2 MG/DL (ref 0–1.2)
BUN SERPL-MCNC: 14 MG/DL (ref 8–27)
BUN/CREAT SERPL: 19 (ref 12–28)
CALCIUM SERPL-MCNC: 9.6 MG/DL (ref 8.7–10.3)
CHLORIDE SERPL-SCNC: 102 MMOL/L (ref 96–106)
CHOLEST SERPL-MCNC: 183 MG/DL (ref 100–199)
CO2 SERPL-SCNC: 27 MMOL/L (ref 20–29)
CREAT SERPL-MCNC: 0.75 MG/DL (ref 0.57–1)
EOSINOPHIL # BLD AUTO: 0.3 X10E3/UL (ref 0–0.4)
EOSINOPHIL NFR BLD AUTO: 5 %
ERYTHROCYTE [DISTWIDTH] IN BLOOD BY AUTOMATED COUNT: 12.8 % (ref 11.7–15.4)
GLOBULIN SER CALC-MCNC: 2.5 G/DL (ref 1.5–4.5)
GLUCOSE SERPL-MCNC: 85 MG/DL (ref 65–99)
HCT VFR BLD AUTO: 36 % (ref 34–46.6)
HDLC SERPL-MCNC: 60 MG/DL
HGB BLD-MCNC: 12.5 G/DL (ref 11.1–15.9)
IMM GRANULOCYTES # BLD AUTO: 0 X10E3/UL (ref 0–0.1)
IMM GRANULOCYTES NFR BLD AUTO: 1 %
LAB CORP EGFR IF AFRICN AM: 82 ML/MIN/1.73
LAB CORP EGFR IF NONAFRICN AM: 71 ML/MIN/1.73
LDLC SERPL CALC-MCNC: 105 MG/DL (ref 0–99)
LYMPHOCYTES # BLD AUTO: 0.8 X10E3/UL (ref 0.7–3.1)
LYMPHOCYTES NFR BLD AUTO: 16 %
MCH RBC QN AUTO: 31 PG (ref 26.6–33)
MCHC RBC AUTO-ENTMCNC: 34.7 G/DL (ref 31.5–35.7)
MCV RBC AUTO: 89 FL (ref 79–97)
MONOCYTES # BLD AUTO: 0.8 X10E3/UL (ref 0.1–0.9)
MONOCYTES NFR BLD AUTO: 15 %
NEUTROPHILS # BLD AUTO: 3.3 X10E3/UL (ref 1.4–7)
NEUTROPHILS NFR BLD AUTO: 62 %
PLATELET # BLD AUTO: 401 X10E3/UL (ref 150–450)
POTASSIUM SERPL-SCNC: 4.5 MMOL/L (ref 3.5–5.2)
PROT SERPL-MCNC: 6.5 G/DL (ref 6–8.5)
RBC # BLD AUTO: 4.03 X10E6/UL (ref 3.77–5.28)
SODIUM SERPL-SCNC: 142 MMOL/L (ref 134–144)
T4 FREE SERPL-MCNC: 1.33 NG/DL (ref 0.82–1.77)
TRIGL SERPL-MCNC: 97 MG/DL (ref 0–149)
TSH SERPL DL<=0.005 MIU/L-ACNC: 2.67 UIU/ML (ref 0.45–4.5)
VLDLC SERPL CALC-MCNC: 18 MG/DL (ref 5–40)
WBC # BLD AUTO: 5.3 X10E3/UL (ref 3.4–10.8)

## 2021-11-11 LAB
APPEARANCE UR: CLEAR
BACTERIA #/AREA URNS HPF: NORMAL /[HPF]
BACTERIA UR CULT: NO GROWTH
BACTERIA UR CULT: NORMAL
BILIRUB UR QL STRIP: NEGATIVE
CASTS URNS QL MICRO: NORMAL /LPF
COLOR UR: YELLOW
EPI CELLS #/AREA URNS HPF: NORMAL /HPF (ref 0–10)
GLUCOSE UR QL: NEGATIVE
HGB UR QL STRIP: NEGATIVE
KETONES UR QL STRIP: NEGATIVE
LAB CORP URINALYSIS REFLEX: ABNORMAL
LEUKOCYTE ESTERASE UR QL STRIP: ABNORMAL
MICRO URNS: ABNORMAL
NITRITE UR QL STRIP: NEGATIVE
PH UR STRIP: 6 [PH] (ref 5–7.5)
PROT UR QL STRIP: NEGATIVE
RBC #/AREA URNS HPF: NORMAL /HPF (ref 0–2)
SP GR UR: 1.01 (ref 1–1.03)
UROBILINOGEN UR STRIP-MCNC: 0.2 MG/DL (ref 0.2–1)
WBC #/AREA URNS HPF: NORMAL /HPF (ref 0–5)

## 2021-11-30 ENCOUNTER — TELEPHONE (OUTPATIENT)
Dept: INTERNAL MEDICINE | Facility: CLINIC | Age: 86
End: 2021-11-30
Payer: COMMERCIAL

## 2022-02-08 PROBLEM — I70.233: Status: ACTIVE | Noted: 2020-10-26

## 2022-02-08 PROBLEM — I50.20 SYSTOLIC CONGESTIVE HEART FAILURE (CMS/HCC): Status: ACTIVE | Noted: 2022-02-08

## 2022-02-08 ASSESSMENT — ENCOUNTER SYMPTOMS
SEIZURES: 0
UNEXPECTED WEIGHT CHANGE: 0
PHOTOPHOBIA: 0
SINUS PRESSURE: 0
DIZZINESS: 0
DIFFICULTY URINATING: 0
ABDOMINAL DISTENTION: 0
HEMATURIA: 0
SHORTNESS OF BREATH: 0
WOUND: 0
ARTHRALGIAS: 0
LIGHT-HEADEDNESS: 0
CONFUSION: 0
CONSTIPATION: 0
CHEST TIGHTNESS: 0
FATIGUE: 0
VOICE CHANGE: 0
APNEA: 0
WHEEZING: 0
SINUS PAIN: 0
TROUBLE SWALLOWING: 0
EYE DISCHARGE: 0
WEAKNESS: 0
TREMORS: 0
ACTIVITY CHANGE: 0
PALPITATIONS: 0
NUMBNESS: 0
BACK PAIN: 0
FEVER: 0
DIARRHEA: 0
BRUISES/BLEEDS EASILY: 0
ABDOMINAL PAIN: 0
BLOOD IN STOOL: 0

## 2022-03-15 NOTE — Clinical Note
The catheter tip was inserted into the right lower leg and a venogram was performed.  [FreeTextEntry1] : I believe at this point that control of her blood pressure will be primarily the underlying mechanism to treat her headaches.  I have asked her to continue monitoring her blood pressure and to discuss this with Dr. Armijo.\par If the headaches persist I will be happy to evaluate her again after the blood pressure has been controlled

## 2022-06-16 ENCOUNTER — HOSPITAL ENCOUNTER (OUTPATIENT)
Facility: CLINIC | Age: 87
Discharge: HOME | End: 2022-06-16
Attending: FAMILY MEDICINE
Payer: COMMERCIAL

## 2022-06-16 ENCOUNTER — APPOINTMENT (OUTPATIENT)
Dept: RADIOLOGY | Age: 87
End: 2022-06-16
Attending: FAMILY MEDICINE
Payer: COMMERCIAL

## 2022-06-16 VITALS
DIASTOLIC BLOOD PRESSURE: 89 MMHG | TEMPERATURE: 98.1 F | RESPIRATION RATE: 17 BRPM | OXYGEN SATURATION: 99 % | SYSTOLIC BLOOD PRESSURE: 168 MMHG | HEART RATE: 73 BPM

## 2022-06-16 DIAGNOSIS — R60.0 PEDAL EDEMA: ICD-10-CM

## 2022-06-16 DIAGNOSIS — I10 HYPERTENSION, UNSPECIFIED TYPE: ICD-10-CM

## 2022-06-16 DIAGNOSIS — R06.02 SHORTNESS OF BREATH: ICD-10-CM

## 2022-06-16 DIAGNOSIS — I50.9 CHRONIC CONGESTIVE HEART FAILURE, UNSPECIFIED HEART FAILURE TYPE (CMS/HCC): Primary | ICD-10-CM

## 2022-06-16 PROCEDURE — S9083 URGENT CARE CENTER GLOBAL: HCPCS | Performed by: FAMILY MEDICINE

## 2022-06-16 PROCEDURE — 99213 OFFICE O/P EST LOW 20 MIN: CPT | Performed by: FAMILY MEDICINE

## 2022-06-16 PROCEDURE — 71046 X-RAY EXAM CHEST 2 VIEWS: CPT | Performed by: FAMILY MEDICINE

## 2022-06-16 ASSESSMENT — ENCOUNTER SYMPTOMS
NEUROLOGICAL NEGATIVE: 1
PALPITATIONS: 0
SHORTNESS OF BREATH: 1
CONSTITUTIONAL NEGATIVE: 1

## 2022-06-16 NOTE — ED PROVIDER NOTES
History  Chief Complaint   Patient presents with   • Shortness of Breath     Chest X-ray .. Crackles in lung per NP at [facility]      Patient, accompanied by her daughter who helps provide the history, is here for evaluation for CHF exacerbation.  The daughter states that the patient was sent here from the nursing home care provider to have a work-up.  Mainly a chest x-ray as a cannot wait 4 days for the contracted groups that the nursing home has.  Blood work was supposed to be done at the nursing home care facility and in order to increase the Lasix to 40 mg daily versus Monday, Wednesday, Friday as per documents provided by the daughter.  Patient is not having any fever. It appears that the order for which x-ray x-ray was not sent to the radiology facility, as explained to the daughter from the healthcare facility.  And she brought her to the urgent care to see if they might be able to facilitate the x-ray.          Past Medical History:   Diagnosis Date   • CHF (congestive heart failure) (CMS/HCC)    • Hypertension    • Lipid disorder        Past Surgical History:   Procedure Laterality Date   • CATARACT EXTRACTION, BILATERAL         Family History   Problem Relation Age of Onset   • No Known Problems Biological Mother    • No Known Problems Biological Father    • Diabetes Biological Son    • Heart attack Biological Brother    • Asthma Biological Brother        Social History     Tobacco Use   • Smoking status: Never Smoker   • Smokeless tobacco: Never Used   Substance Use Topics   • Alcohol use: Never   • Drug use: Defer       Review of Systems   Constitutional: Negative.    Respiratory: Positive for shortness of breath.    Cardiovascular: Positive for leg swelling. Negative for chest pain and palpitations.   Neurological: Negative.    All other systems reviewed and are negative.      Physical Exam  ED Triage Vitals [06/16/22 1639]   Temp Heart Rate Resp BP SpO2   36.7 °C (98.1 °F) 73 17 (!) 168/89 99 %       Temp Source Heart Rate Source Patient Position BP Location FiO2 (%) (Set)   Oral Monitor Sitting Right upper arm --       Physical Exam  Vitals and nursing note reviewed.   Constitutional:       General: She is awake. She is not in acute distress.     Appearance: Normal appearance. She is normal weight. She is not ill-appearing, toxic-appearing or diaphoretic.   HENT:      Head: Normocephalic and atraumatic.   Eyes:      General:         Right eye: No discharge.         Left eye: No discharge.      Extraocular Movements: Extraocular movements intact.      Conjunctiva/sclera: Conjunctivae normal.   Cardiovascular:      Rate and Rhythm: Normal rate.      Pulses: Normal pulses.   Pulmonary:      Effort: Pulmonary effort is normal. No tachypnea, bradypnea or respiratory distress.      Breath sounds: No stridor. No wheezing, rhonchi or rales.   Musculoskeletal:      Right lower le+ Edema present.      Left lower leg: 3+ Edema present.   Neurological:      General: No focal deficit present.      Mental Status: She is alert and oriented to person, place, and time.      Cranial Nerves: No dysarthria or facial asymmetry.      Gait: Gait normal.   Psychiatric:         Mood and Affect: Mood normal.         Behavior: Behavior normal. Behavior is cooperative.           Procedures  Procedures    UC Course  Clinical Impressions as of 22 1742   Chronic congestive heart failure, unspecified heart failure type (CMS/HCC)   Shortness of breath   Pedal edema   Hypertension, unspecified type       MDM  As per documents provided by the daughter, the plan was to increase the Lasix to 40 mg daily for several days.  I would recommend follow-up chest x-ray and monitoring the patient closely to see if her symptoms improve.  At this point I do not see a need for hospital admission.  Unfortunately the lab is closed and the blood work will be deferred back to the nursing home facility.  Alternatively they may return tomorrow morning/  to a different facility TONIGHT.    Narrative & Impression     CLINICAL HISTORY:    Shortness of breath and concern for pulmonary edema.     COMMENT:     Comparison: None.     PA and lateral radiographs of the chest were obtained.     The heart is enlarged. There is bilateral mild interstitial prominence likely  due to mild interstitial edema. No definite consolidation. No pleural effusion  or evidence of a pneumothorax. Posterior and lateral deformities of the right  ribs are noted of unknown exact chronicity.     --  IMPRESSION:     1. Bilateral mild interstitial prominence likely due to mild interstitial edema  given the history.  2. Deformities of the right ribs likely due to fractures of unknown exact  chronicity.         Rom Parker MD  06/16/22 5365

## 2022-06-16 NOTE — DISCHARGE INSTRUCTIONS
Medications to be taken/used as recommended.  Please read all patient education handout regarding your diagnosis.  It is Strongly Recommended that you Follow up with your Primary care provider if your symptoms do not improve in 72 Hours or return to [facility].   ER warnings provided: go to ER if symptoms worsen at any time or cause worry.   Please continue medications as per primary care doctor instructions.      Narrative & Impression     CLINICAL HISTORY:    Shortness of breath and concern for pulmonary edema.     COMMENT:     Comparison: None.     PA and lateral radiographs of the chest were obtained.     The heart is enlarged. There is bilateral mild interstitial prominence likely  due to mild interstitial edema. No definite consolidation. No pleural effusion  or evidence of a pneumothorax. Posterior and lateral deformities of the right  ribs are noted of unknown exact chronicity.     --  IMPRESSION:     1. Bilateral mild interstitial prominence likely due to mild interstitial edema  given the history.  2. Deformities of the right ribs likely due to fractures of unknown exact  chronicity.

## 2022-08-01 ENCOUNTER — HOSPITAL ENCOUNTER (EMERGENCY)
Facility: HOSPITAL | Age: 87
Discharge: HOME | End: 2022-08-01
Attending: EMERGENCY MEDICINE
Payer: COMMERCIAL

## 2022-08-01 ENCOUNTER — APPOINTMENT (EMERGENCY)
Dept: RADIOLOGY | Facility: HOSPITAL | Age: 87
End: 2022-08-01
Attending: EMERGENCY MEDICINE
Payer: COMMERCIAL

## 2022-08-01 VITALS
HEART RATE: 68 BPM | SYSTOLIC BLOOD PRESSURE: 182 MMHG | HEIGHT: 61 IN | BODY MASS INDEX: 18.15 KG/M2 | DIASTOLIC BLOOD PRESSURE: 70 MMHG | RESPIRATION RATE: 18 BRPM | OXYGEN SATURATION: 95 % | WEIGHT: 96.12 LBS | TEMPERATURE: 98 F

## 2022-08-01 DIAGNOSIS — S32.050A COMPRESSION FRACTURE OF L5 VERTEBRA, INITIAL ENCOUNTER (CMS/HCC): Primary | ICD-10-CM

## 2022-08-01 PROCEDURE — 72100 X-RAY EXAM L-S SPINE 2/3 VWS: CPT

## 2022-08-01 PROCEDURE — 63700000 HC SELF-ADMINISTRABLE DRUG: Performed by: EMERGENCY MEDICINE

## 2022-08-01 PROCEDURE — 73502 X-RAY EXAM HIP UNI 2-3 VIEWS: CPT | Mod: RT

## 2022-08-01 PROCEDURE — 99283 EMERGENCY DEPT VISIT LOW MDM: CPT | Mod: 25

## 2022-08-01 RX ORDER — ACETAMINOPHEN 325 MG/1
650 TABLET ORAL ONCE
Status: COMPLETED | OUTPATIENT
Start: 2022-08-01 | End: 2022-08-01

## 2022-08-01 RX ADMIN — ACETAMINOPHEN 650 MG: 325 TABLET ORAL at 12:22

## 2022-08-01 ASSESSMENT — ENCOUNTER SYMPTOMS
LEG PAIN: 0
FACIAL ASYMMETRY: 0
FEVER: 0
BACK PAIN: 1
EYE REDNESS: 0
WOUND: 0
WHEEZING: 0
NUMBNESS: 0
ABDOMINAL SWELLING: 0
ABDOMINAL PAIN: 0
WEAKNESS: 0
SHORTNESS OF BREATH: 0
CONFUSION: 1
STRIDOR: 0
COUGH: 0
JOINT SWELLING: 0

## 2022-08-01 NOTE — ED PROVIDER NOTES
Emergency Medicine Note  HPI   HISTORY OF PRESENT ILLNESS     89-year-old female who has some mild dementia presents emergency room from [facility] for evaluation of some back pain over the past 24 hours.  Unknown if the patient fell.  Patient states she thinks she may have fallen last week.  No headache.  No chest pain.  No shortness of breath.  No abdominal pain.  No vomiting.  No limitation with range of motion of the lower extremities.  Pulses normal.  No swelling in the legs.      Back Pain  Location:  Lumbar spine and sacro-iliac joint  Quality:  Aching  Radiates to:  Does not radiate  Pain severity:  Moderate  Onset quality:  Gradual  Duration:  1 day  Timing:  Constant  Progression:  Unchanged  Chronicity:  New  Context: not MCA, not MVA, not recent illness and not recent injury    Relieved by:  Lying down  Worsened by:  Movement  Associated symptoms: no abdominal pain, no abdominal swelling, no fever, no leg pain, no numbness and no weakness          Patient History   PAST HISTORY     Reviewed from Nursing Triage:         Past Medical History:   Diagnosis Date   • CHF (congestive heart failure) (CMS/[facility])    • Hypertension    • Lipid disorder        Past Surgical History:   Procedure Laterality Date   • CATARACT EXTRACTION, BILATERAL         Family History   Problem Relation Age of Onset   • No Known Problems Biological Mother    • No Known Problems Biological Father    • Diabetes Biological Son    • Heart attack Biological Brother    • Asthma Biological Brother        Social History     Tobacco Use   • Smoking status: Never Smoker   • Smokeless tobacco: Never Used   Substance Use Topics   • Alcohol use: Never   • Drug use: Defer         Review of Systems   REVIEW OF SYSTEMS     Review of Systems   Constitutional: Negative for fever.   Eyes: Negative for redness.   Respiratory: Negative for cough, shortness of breath, wheezing and stridor.    Cardiovascular: Negative for leg swelling.   Gastrointestinal:  Negative for abdominal pain.   Musculoskeletal: Positive for back pain. Negative for joint swelling.   Skin: Negative for pallor, rash and wound.   Neurological: Negative for facial asymmetry, weakness and numbness.   Psychiatric/Behavioral: Positive for confusion.         VITALS     ED Vitals    Date/Time Temp Pulse Resp BP SpO2 [facility]   08/01/22 1148 36.7 °C (98 °F) 68 18 182/70 95 % MAL                       Physical Exam   PHYSICAL EXAM     Physical Exam  Constitutional:       Appearance: She is not toxic-appearing or diaphoretic.      Comments: Slight discomfort, nontoxic   HENT:      Head: Atraumatic.      Mouth/Throat:      Mouth: Mucous membranes are moist.   Eyes:      Conjunctiva/sclera: Conjunctivae normal.   Cardiovascular:      Rate and Rhythm: Normal rate and regular rhythm.      Pulses: Normal pulses.   Pulmonary:      Effort: No respiratory distress.      Breath sounds: No stridor. No wheezing or rales.   Abdominal:      General: There is no distension.      Palpations: There is no mass.      Tenderness: There is no abdominal tenderness.      Hernia: No hernia is present.   Musculoskeletal:         General: Normal range of motion.      Comments: Right SI tenderness w mild hypertonicity   Skin:     Capillary Refill: Capillary refill takes less than 2 seconds.   Neurological:      General: No focal deficit present.      Mental Status: She is alert. She is disoriented.           PROCEDURES     Procedures     DATA     Results     None          Imaging Results          X-RAY LUMBAR SPINE 2 OR 3 VIEWS (Final result)  Result time 08/01/22 13:08:53    Final result                 Impression:    IMPRESSION:    Age-indeterminate loss in height of multiple lumbar vertebral bodies. Follow-up  can be made with MRI as warranted.                 Narrative:    CLINICAL HISTORY: back pain    COMPARISON: None.    COMMENT:  Technique: 3 view x-ray of the lumbar spine.    There is age-indeterminate moderate to severe loss  in height of L5 and mild of  L3. There may also be mild age-indeterminate loss in height of superior L2.  There is multilevel degenerative spurring and facet arthropathy.                               X-RAY HIP WITH OR WITHOUT PELVIS 2-3 VW RIGHT (Final result)  Result time 08/01/22 13:10:22    Final result                 Impression:    IMPRESSION:    No acute fracture or dislocation.                 Narrative:    CLINICAL HISTORY: pelvic pain    COMPARISON: None.    COMMENT:  Technique: 3 view x-ray of the right hip including frontal view of the pelvis.    There is no acute fracture or dislocation of the right hip. There is moderate to  severe joint space loss within the right hip with mild spurring.                                No orders to display       Scoring tools                                 ED Course & MDM   MDM / ED COURSE / CLINICAL IMPRESSIONS / DISPO     MDM    ED Course as of 08/01/22 1349   Mon Aug 01, 2022   1347 Test results dw pt. She states pain has improved w apap. Will dc to [facility] [MR]      ED Course User Index  [MR] Reagan Napoles, DO     Discharge         Reagan Napoles, DO  08/01/22 134

## 2022-09-20 NOTE — INTERVAL H&P NOTE
H&P reviewed. The patient was examined and there are no changes to the H&P.   Patient wanted to know if she needed to get any blood work done prior to appointment or any other tests completed  Patient has OV scheduled for tomorrow

## (undated) DEVICE — GLIDEWIRE ADVANTAGE 260CM

## (undated) DEVICE — SYRINGE DISP LUER-LOK 20 CC

## (undated) DEVICE — KIT INFLATION DEVICE ENCORE 26 ADVANTAGE

## (undated) DEVICE — SHEATH 5FR 10CM PINNACLE INTRO

## (undated) DEVICE — INTRODUCER S-MAK ACCESS KIT 4FR 10CM STIFF

## (undated) DEVICE — CATH BALL COYOTE .014 OTW 2.5X80X150

## (undated) DEVICE — CATH BALL COYOTE .014 OTW 4X80X150

## (undated) DEVICE — CATH BALL COYOTE .014 OTW 2X80X150

## (undated) DEVICE — GLIDEWIRE ADVANTAGE 25CM .014 300CM ANGLED

## (undated) DEVICE — GUIDEWIRE ASAHI MIRACLEBROS3 300CM

## (undated) DEVICE — FLUSH OMNI SIZING 5 FR

## (undated) DEVICE — CATH TRAILBLAZER 0.014" X 150CM

## (undated) DEVICE — SHEATH FLEXOR ANSEL 5FR 55CM MP

## (undated) DEVICE — *T* CLOSURE DEVICE MYNX CONTROL 5FR
Type: IMPLANTABLE DEVICE | Site: GROIN | Status: NON-FUNCTIONAL
Removed: 2020-11-24

## (undated) DEVICE — ***USE 121412***PACK DEVICE IMPLANT TLH

## (undated) DEVICE — KIT CATH LAB ANGIO

## (undated) DEVICE — KIT CE-COVER LFREE ST 14X91CM